# Patient Record
Sex: MALE | Race: WHITE | NOT HISPANIC OR LATINO | Employment: OTHER | URBAN - METROPOLITAN AREA
[De-identification: names, ages, dates, MRNs, and addresses within clinical notes are randomized per-mention and may not be internally consistent; named-entity substitution may affect disease eponyms.]

---

## 2018-05-02 ENCOUNTER — OFFICE VISIT (OUTPATIENT)
Dept: INTERNAL MEDICINE | Facility: CLINIC | Age: 73
End: 2018-05-02
Attending: FAMILY MEDICINE
Payer: MEDICARE

## 2018-05-02 VITALS
RESPIRATION RATE: 18 BRPM | WEIGHT: 216.2 LBS | OXYGEN SATURATION: 97 % | SYSTOLIC BLOOD PRESSURE: 138 MMHG | DIASTOLIC BLOOD PRESSURE: 70 MMHG | HEART RATE: 80 BPM | BODY MASS INDEX: 28.65 KG/M2 | TEMPERATURE: 98.4 F | HEIGHT: 73 IN

## 2018-05-02 DIAGNOSIS — L30.9 DERMATITIS: ICD-10-CM

## 2018-05-02 DIAGNOSIS — J45.20 MILD INTERMITTENT ASTHMA, UNSPECIFIED WHETHER COMPLICATED: Primary | ICD-10-CM

## 2018-05-02 DIAGNOSIS — Z13.220 LIPID SCREENING: ICD-10-CM

## 2018-05-02 PROCEDURE — 99214 OFFICE O/P EST MOD 30 MIN: CPT | Performed by: FAMILY MEDICINE

## 2018-05-02 RX ORDER — FLUTICASONE FUROATE AND VILANTEROL 100; 25 UG/1; UG/1
POWDER RESPIRATORY (INHALATION) DAILY
COMMUNITY
Start: 2018-01-09 | End: 2018-06-11 | Stop reason: ALTCHOICE

## 2018-05-02 RX ORDER — MOMETASONE FUROATE 1 MG/G
0.1 CREAM TOPICAL DAILY
COMMUNITY
Start: 2017-11-01 | End: 2019-04-19 | Stop reason: SDUPTHER

## 2018-05-02 RX ORDER — KETOCONAZOLE 20 MG/G
2 CREAM TOPICAL DAILY
COMMUNITY
Start: 2017-11-01 | End: 2022-09-13 | Stop reason: SDUPTHER

## 2018-05-02 RX ORDER — FLUTICASONE PROPIONATE 50 MCG
1 SPRAY, SUSPENSION (ML) NASAL DAILY
Qty: 1 BOTTLE | Refills: 5 | Status: SHIPPED | OUTPATIENT
Start: 2018-05-02 | End: 2019-05-20 | Stop reason: SDUPTHER

## 2018-05-02 RX ORDER — KETOCONAZOLE 20 MG/ML
2 SHAMPOO, SUSPENSION TOPICAL DAILY
COMMUNITY
Start: 2017-11-01 | End: 2019-05-20 | Stop reason: SDUPTHER

## 2018-05-02 RX ORDER — FLUOCINONIDE 0.5 MG/G
0.05 CREAM TOPICAL DAILY
COMMUNITY
End: 2019-05-20 | Stop reason: SDUPTHER

## 2018-05-02 RX ORDER — TACROLIMUS 1 MG/G
0.1 OINTMENT TOPICAL DAILY
COMMUNITY
Start: 2017-02-03 | End: 2023-12-04 | Stop reason: SDUPTHER

## 2018-05-02 RX ORDER — CLOBETASOL PROPIONATE 0.5 MG/G
0.05 CREAM TOPICAL DAILY
COMMUNITY
Start: 2016-05-02 | End: 2018-11-14 | Stop reason: SDUPTHER

## 2018-05-02 RX ORDER — ALBUTEROL SULFATE 90 UG/1
2 INHALANT RESPIRATORY (INHALATION) EVERY 6 HOURS PRN
Qty: 1 INHALER | Refills: 11 | Status: SHIPPED | OUTPATIENT
Start: 2018-05-02 | End: 2022-01-19 | Stop reason: SDUPTHER

## 2018-05-02 RX ORDER — AZELASTINE 1 MG/ML
SPRAY, METERED NASAL DAILY
COMMUNITY
Start: 2017-11-01 | End: 2019-01-16 | Stop reason: SDUPTHER

## 2018-05-02 ASSESSMENT — ENCOUNTER SYMPTOMS
FATIGUE: 0
WEAKNESS: 0
HEADACHES: 0
DIFFICULTY URINATING: 0
CHEST TIGHTNESS: 1
NERVOUS/ANXIOUS: 0
DYSPNEA ON EXERTION: 0
COUGH: 0
ABDOMINAL PAIN: 0
APPETITE CHANGE: 0
SLEEP DISTURBANCE: 0
BACK PAIN: 0
DYSURIA: 0
SHORTNESS OF BREATH: 0
TROUBLE SWALLOWING: 0
CONSTIPATION: 0

## 2018-05-02 NOTE — PATIENT INSTRUCTIONS
Problem List Items Addressed This Visit     Mild intermittent asthma - Primary     Stable, continue to use inhalers           Relevant Medications    fluticasone-vilanterol (BREO ELLIPTA) 100-25 mcg/dose powder for inhalation    albuterol HFA (VENTOLIN HFA) 90 mcg/actuation inhaler    Other Relevant Orders    CBC    Comprehensive metabolic panel    Dermatitis     Continue to use topical steroids as needed  Follow up with dermatology          Relevant Medications    clobetasol (TEMOVATE) 0.05 % cream    fluocinonide (LIDEX) 0.05 % cream    ketoconazole (NIZORAL) 2 % shampoo    ketoconazole (NIZORAL) 2 % cream    mometasone (ELOCON) 0.1 % cream    tacrolimus (PROTOPIC) 0.1 % ointment      Other Visit Diagnoses     Lipid screening        Relevant Orders    Lipid panel          No Follow-up on file.    Chuck Ahuja DO

## 2018-05-02 NOTE — PROGRESS NOTES
Chief Complaint   Patient presents with   • Follow-up     6 mo f/u        Subjective      Patient ID: Zaki Wilder is a 72 y.o. male.    Pt peels that his asthma is less well controlled since changing to Breo from Advair due to insurance formulary change.      Asthma   He complains of chest tightness. There is no cough or shortness of breath. This is a chronic problem. The current episode started more than 1 year ago. The problem occurs intermittently. The problem has been gradually worsening. Associated symptoms include postnasal drip. Pertinent negatives include no appetite change, chest pain, dyspnea on exertion, headaches or trouble swallowing. His symptoms are aggravated by nothing. His symptoms are alleviated by beta-agonist. His past medical history is significant for asthma.       The following have been reviewed and updated as appropriate in this visit:        Review of Systems   Constitutional: Negative for appetite change and fatigue.   HENT: Positive for postnasal drip. Negative for hearing loss and trouble swallowing.    Eyes: Negative for visual disturbance.   Respiratory: Negative for cough and shortness of breath.    Cardiovascular: Negative for chest pain, dyspnea on exertion and leg swelling.   Gastrointestinal: Negative for abdominal pain and constipation.   Genitourinary: Negative for difficulty urinating and dysuria.   Musculoskeletal: Negative for back pain and gait problem.   Skin: Negative for rash.   Neurological: Negative for weakness and headaches.   Psychiatric/Behavioral: Negative for sleep disturbance. The patient is not nervous/anxious.          Current Outpatient Prescriptions:   •  azelastine (ASTELIN) 137 mcg (0.1 %) nasal spray, Inhale daily., Disp: , Rfl:   •  clobetasol (TEMOVATE) 0.05 % cream, Apply 0.05 % topically daily., Disp: , Rfl:   •  fluocinonide (LIDEX) 0.05 % cream, Apply 0.05 % topically daily., Disp: , Rfl:   •  fluticasone-vilanterol (BREO ELLIPTA) 100-25 mcg/dose  "powder for inhalation, Inhale daily., Disp: , Rfl:   •  ketoconazole (NIZORAL) 2 % cream, Apply 2 % topically daily., Disp: , Rfl:   •  ketoconazole (NIZORAL) 2 % shampoo, Apply 2 % topically daily., Disp: , Rfl:   •  mometasone (ELOCON) 0.1 % cream, Apply 0.1 % topically daily., Disp: , Rfl:   •  tacrolimus (PROTOPIC) 0.1 % ointment, Apply 0.1 % topically daily., Disp: , Rfl:   •  albuterol HFA (VENTOLIN HFA) 90 mcg/actuation inhaler, Inhale 2 puffs every 6 (six) hours as needed for wheezing., Disp: 1 Inhaler, Rfl: 11  •  fluticasone (FLONASE) 50 mcg/actuation nasal spray, Administer 1 spray into each nostril daily., Disp: 1 Bottle, Rfl: 5    Objective     Vitals:    05/02/18 1002   BP: 138/70   BP Location: Left upper arm   Patient Position: Sitting   Pulse: 80   Resp: 18   Temp: 36.9 °C (98.4 °F)   TempSrc: Oral   SpO2: 97%   Weight: 98.1 kg (216 lb 3.2 oz)   Height: 1.854 m (6' 1\")     Physical Exam   Constitutional: He appears well-developed and well-nourished.   HENT:   Head: Normocephalic.   Eyes: Pupils are equal, round, and reactive to light.   Neck: Normal range of motion.   Cardiovascular: Normal rate and regular rhythm.    Pulmonary/Chest: Effort normal and breath sounds normal.   Abdominal: Soft. Bowel sounds are normal.   Musculoskeletal: Normal range of motion.   Neurological: He is alert.   Skin: Skin is warm and dry.   Psychiatric: He has a normal mood and affect.         Assessment/Plan   Problem List Items Addressed This Visit     Mild intermittent asthma - Primary     Stable, continue to use inhalers           Relevant Medications    fluticasone-vilanterol (BREO ELLIPTA) 100-25 mcg/dose powder for inhalation    albuterol HFA (VENTOLIN HFA) 90 mcg/actuation inhaler    Other Relevant Orders    CBC    Comprehensive metabolic panel    Dermatitis     Continue to use topical steroids as needed  Follow up with dermatology          Relevant Medications    clobetasol (TEMOVATE) 0.05 % cream    " fluocinonide (LIDEX) 0.05 % cream    ketoconazole (NIZORAL) 2 % shampoo    ketoconazole (NIZORAL) 2 % cream    mometasone (ELOCON) 0.1 % cream    tacrolimus (PROTOPIC) 0.1 % ointment      Other Visit Diagnoses     Lipid screening        Relevant Orders    Lipid panel          No Follow-up on file.    Chuck Ahuja, DO

## 2018-05-07 ENCOUNTER — CLINICAL SUPPORT (OUTPATIENT)
Dept: INTERNAL MEDICINE | Facility: CLINIC | Age: 73
End: 2018-05-07
Payer: MEDICARE

## 2018-05-07 DIAGNOSIS — J45.20 MILD INTERMITTENT ASTHMA, UNSPECIFIED WHETHER COMPLICATED: ICD-10-CM

## 2018-05-07 DIAGNOSIS — Z13.220 SCREENING FOR LIPOID DISORDERS: ICD-10-CM

## 2018-05-07 DIAGNOSIS — Z13.220 LIPID SCREENING: ICD-10-CM

## 2018-05-07 LAB
ALBUMIN SERPL-MCNC: 4.5 G/DL (ref 3.4–5)
ALP SERPL-CCNC: 58 IU/L (ref 35–126)
ALT SERPL-CCNC: 25 IU/L (ref 16–63)
ANION GAP SERPL CALC-SCNC: 8 MEQ/L (ref 3–15)
AST SERPL-CCNC: 26 IU/L (ref 15–41)
BILIRUB SERPL-MCNC: 0.8 MG/DL (ref 0.3–1.2)
BUN SERPL-MCNC: 9 MG/DL (ref 8–20)
CALCIUM SERPL-MCNC: 9.6 MG/DL (ref 8.9–10.3)
CHLORIDE SERPL-SCNC: 106 MMOL/L (ref 98–109)
CHOLEST SERPL-MCNC: 207 MG/DL
CO2 SERPL-SCNC: 26 MMOL/L (ref 22–32)
CREAT SERPL-MCNC: 1 MG/DL (ref 0.8–1.3)
ERYTHROCYTE [DISTWIDTH] IN BLOOD BY AUTOMATED COUNT: 13.4 % (ref 11.6–14.4)
GFR SERPL CREATININE-BSD FRML MDRD: >60 ML/MIN/1.73M*2
GLUCOSE SERPL-MCNC: 88 MG/DL (ref 70–99)
HCT VFR BLDCO AUTO: 49.3 % (ref 40–51)
HDLC SERPL-MCNC: 55 MG/DL
HDLC SERPL: 3.8 {RATIO}
HGB BLD-MCNC: 16.4 G/DL (ref 13.7–17.5)
LDLC SERPL CALC-MCNC: 134 MG/DL
MCH RBC QN AUTO: 30.5 PG (ref 28–33.2)
MCHC RBC AUTO-ENTMCNC: 33.3 G/DL (ref 32.2–36.5)
MCV RBC AUTO: 91.6 FL (ref 83–98)
NONHDLC SERPL-MCNC: 152 MG/DL
PDW BLD AUTO: 10.1 FL (ref 9.4–12.4)
PLATELET # BLD AUTO: 212 K/UL (ref 150–350)
POTASSIUM SERPL-SCNC: 4.4 MMOL/L (ref 3.6–5.1)
PROT SERPL-MCNC: 6.9 G/DL (ref 6–8.2)
RBC # BLD AUTO: 5.38 M/UL (ref 4.5–5.8)
SODIUM SERPL-SCNC: 140 MMOL/L (ref 136–144)
TRIGL SERPL-MCNC: 90 MG/DL (ref 30–149)
WBC # BLD AUTO: 8.8 K/UL (ref 3.8–10.5)

## 2018-05-07 PROCEDURE — 36415 COLL VENOUS BLD VENIPUNCTURE: CPT

## 2018-05-07 PROCEDURE — 80053 COMPREHEN METABOLIC PANEL: CPT | Performed by: FAMILY MEDICINE

## 2018-05-07 PROCEDURE — 80061 LIPID PANEL: CPT | Performed by: FAMILY MEDICINE

## 2018-05-07 PROCEDURE — 36415 COLL VENOUS BLD VENIPUNCTURE: CPT | Performed by: INTERNAL MEDICINE

## 2018-05-07 PROCEDURE — 85027 COMPLETE CBC AUTOMATED: CPT | Performed by: FAMILY MEDICINE

## 2018-06-11 ENCOUNTER — OFFICE VISIT (OUTPATIENT)
Dept: INTERNAL MEDICINE | Facility: CLINIC | Age: 73
End: 2018-06-11
Payer: MEDICARE

## 2018-06-11 VITALS
WEIGHT: 218.8 LBS | HEART RATE: 73 BPM | TEMPERATURE: 97.9 F | RESPIRATION RATE: 16 BRPM | BODY MASS INDEX: 29 KG/M2 | DIASTOLIC BLOOD PRESSURE: 92 MMHG | HEIGHT: 73 IN | OXYGEN SATURATION: 95 % | SYSTOLIC BLOOD PRESSURE: 149 MMHG

## 2018-06-11 DIAGNOSIS — N48.9 PENILE LESION: Primary | ICD-10-CM

## 2018-06-11 DIAGNOSIS — L30.9 DERMATITIS: ICD-10-CM

## 2018-06-11 PROCEDURE — 99214 OFFICE O/P EST MOD 30 MIN: CPT | Performed by: FAMILY MEDICINE

## 2018-06-11 RX ORDER — FLUTICASONE PROPIONATE AND SALMETEROL 100; 50 UG/1; UG/1
1 POWDER RESPIRATORY (INHALATION)
Qty: 60 EACH | Refills: 5 | Status: SHIPPED | OUTPATIENT
Start: 2018-06-11 | End: 2018-11-14 | Stop reason: SDUPTHER

## 2018-06-11 NOTE — PATIENT INSTRUCTIONS
Problem List Items Addressed This Visit     Dermatitis     If no improvement I will ask you to see dermatology         Penile lesion - Primary     Suspect candidal infection  Stop using mometasone on lesion  Stop using mupirocin  Start using ketoconazole cream twice daily  Keep area clean               No Follow-up on file.    Chuck Ahuja, DO

## 2018-06-11 NOTE — ASSESSMENT & PLAN NOTE
Suspect candidal infection  Stop using mometasone on lesion  Stop using mupirocin  Start using ketoconazole cream twice daily  Keep area clean

## 2018-06-11 NOTE — PROGRESS NOTES
Chief Complaint   Patient presents with   • Other     wound  on penis , x 1 month .red , burning . pt used ointment on the area   • Other     lab results        Subjective      Patient ID: Zaki Wilder is a 72 y.o. male.    Rash   This is a new problem. The current episode started 1 to 4 weeks ago. The problem has been gradually worsening since onset. Location: Penis. The rash is characterized by redness (ulcerated). He was exposed to nothing. Pertinent negatives include no cough, fatigue or shortness of breath. Past treatments include topical steroids and antibiotic cream. The treatment provided no relief. His past medical history is significant for asthma and eczema.       The following have been reviewed and updated as appropriate in this visit:        Review of Systems   Constitutional: Negative for appetite change and fatigue.   HENT: Negative for hearing loss and trouble swallowing.    Eyes: Negative for visual disturbance.   Respiratory: Negative for cough and shortness of breath.    Cardiovascular: Negative for chest pain and leg swelling.   Gastrointestinal: Negative for abdominal pain and constipation.   Genitourinary: Positive for genital sores. Negative for decreased urine volume, difficulty urinating, discharge, dysuria, hematuria, penile pain, penile swelling, testicular pain and urgency.   Musculoskeletal: Negative for back pain and gait problem.   Skin: Positive for rash.   Neurological: Negative for weakness and headaches.   Psychiatric/Behavioral: Negative for sleep disturbance. The patient is not nervous/anxious.          Current Outpatient Prescriptions:   •  azelastine (ASTELIN) 137 mcg (0.1 %) nasal spray, Inhale daily., Disp: , Rfl:   •  clobetasol (TEMOVATE) 0.05 % cream, Apply 0.05 % topically daily., Disp: , Rfl:   •  fluocinonide (LIDEX) 0.05 % cream, Apply 0.05 % topically daily., Disp: , Rfl:   •  ketoconazole (NIZORAL) 2 % cream, Apply 2 % topically daily., Disp: , Rfl:   •   "ketoconazole (NIZORAL) 2 % shampoo, Apply 2 % topically daily., Disp: , Rfl:   •  mometasone (ELOCON) 0.1 % cream, Apply 0.1 % topically daily., Disp: , Rfl:   •  tacrolimus (PROTOPIC) 0.1 % ointment, Apply 0.1 % topically daily., Disp: , Rfl:   •  fluticasone-salmeterol (ADVAIR DISKUS) 100-50 mcg/dose diskus inhaler, Inhale 1 puff 2 (two) times a day., Disp: 60 each, Rfl: 5    Objective     Vitals:    06/11/18 1433   BP: (!) 149/92   BP Location: Left upper arm   Patient Position: Sitting   Pulse: 73   Resp: 16   Temp: 36.6 °C (97.9 °F)   TempSrc: Oral   SpO2: 95%   Weight: 99.2 kg (218 lb 12.8 oz)   Height: 1.848 m (6' 0.75\")     Physical Exam   Constitutional: He is oriented to person, place, and time. He appears well-developed and well-nourished.   Eyes: EOM are normal. Pupils are equal, round, and reactive to light.   Neck: Normal range of motion. Neck supple.   Cardiovascular: Normal rate and regular rhythm.    Pulmonary/Chest: Effort normal and breath sounds normal.   Abdominal: Soft. Bowel sounds are normal. There is no tenderness.   Genitourinary:         Musculoskeletal: Normal range of motion.   Lymphadenopathy:     He has no cervical adenopathy.   Neurological: He is alert and oriented to person, place, and time.   Skin: Skin is warm and dry.         Assessment/Plan   Problem List Items Addressed This Visit     Dermatitis     If no improvement I will ask you to see dermatology         Penile lesion - Primary     Suspect candidal infection  Stop using mometasone on lesion  Stop using mupirocin  Start using ketoconazole cream twice daily  Keep area clean               No Follow-up on file.    Chuck Ahuja, DO  "

## 2018-06-13 ASSESSMENT — ENCOUNTER SYMPTOMS
SHORTNESS OF BREATH: 0
ABDOMINAL PAIN: 0
NERVOUS/ANXIOUS: 0
CONSTIPATION: 0
HEMATURIA: 0
APPETITE CHANGE: 0
SLEEP DISTURBANCE: 0
FATIGUE: 0
BACK PAIN: 0
TROUBLE SWALLOWING: 0
DYSURIA: 0
COUGH: 0
HEADACHES: 0
WEAKNESS: 0
DIFFICULTY URINATING: 0

## 2018-11-14 ENCOUNTER — OFFICE VISIT (OUTPATIENT)
Dept: INTERNAL MEDICINE | Facility: CLINIC | Age: 73
End: 2018-11-14
Payer: MEDICARE

## 2018-11-14 VITALS
WEIGHT: 215 LBS | OXYGEN SATURATION: 95 % | TEMPERATURE: 98.4 F | RESPIRATION RATE: 16 BRPM | HEIGHT: 73 IN | DIASTOLIC BLOOD PRESSURE: 80 MMHG | BODY MASS INDEX: 28.49 KG/M2 | HEART RATE: 84 BPM | SYSTOLIC BLOOD PRESSURE: 140 MMHG

## 2018-11-14 DIAGNOSIS — J45.20 MILD INTERMITTENT ASTHMA, UNSPECIFIED WHETHER COMPLICATED: ICD-10-CM

## 2018-11-14 DIAGNOSIS — M47.816 LUMBAR SPONDYLOSIS: Primary | ICD-10-CM

## 2018-11-14 DIAGNOSIS — M54.41 CHRONIC RIGHT-SIDED LOW BACK PAIN WITH RIGHT-SIDED SCIATICA: ICD-10-CM

## 2018-11-14 DIAGNOSIS — G89.29 CHRONIC RIGHT-SIDED LOW BACK PAIN WITH RIGHT-SIDED SCIATICA: ICD-10-CM

## 2018-11-14 PROCEDURE — 99214 OFFICE O/P EST MOD 30 MIN: CPT | Mod: 25 | Performed by: FAMILY MEDICINE

## 2018-11-14 PROCEDURE — G0008 ADMIN INFLUENZA VIRUS VAC: HCPCS | Performed by: FAMILY MEDICINE

## 2018-11-14 PROCEDURE — 90653 IIV ADJUVANT VACCINE IM: CPT | Performed by: FAMILY MEDICINE

## 2018-11-14 RX ORDER — FLUTICASONE PROPIONATE AND SALMETEROL 100; 50 UG/1; UG/1
1 POWDER RESPIRATORY (INHALATION)
Qty: 180 EACH | Refills: 3 | Status: SHIPPED | OUTPATIENT
Start: 2018-11-14 | End: 2019-06-14 | Stop reason: SDUPTHER

## 2018-11-14 RX ORDER — CLOBETASOL PROPIONATE 0.5 MG/G
CREAM TOPICAL
Qty: 45 G | Refills: 1 | OUTPATIENT
Start: 2018-11-14 | End: 2023-01-07

## 2018-11-14 ASSESSMENT — ENCOUNTER SYMPTOMS
FATIGUE: 0
SLEEP DISTURBANCE: 0
COUGH: 0
TROUBLE SWALLOWING: 0
MYALGIAS: 1
ARTHRALGIAS: 1
DIFFICULTY URINATING: 0
WEAKNESS: 0
BACK PAIN: 0
NERVOUS/ANXIOUS: 0
HEADACHES: 0
ABDOMINAL PAIN: 0
SHORTNESS OF BREATH: 0
DYSURIA: 0
CONSTIPATION: 0
APPETITE CHANGE: 0

## 2018-11-14 NOTE — PATIENT INSTRUCTIONS
Problem List Items Addressed This Visit     Mild intermittent asthma     Stable, I will change you from Breo back to Advair in January as your insurance allows         Relevant Medications    fluticasone-salmeterol (ADVAIR DISKUS) 100-50 mcg/dose diskus inhaler    Lumbar spondylosis - Primary     I will order X-rays of your lower back  Refer to physical therapy  Please consider cutting back on calories a little, modest weight loss can take strain off of your lower back.         Relevant Orders    X-RAY LUMBAR SPINE 2 OR 3 VIEWS    Ambulatory referral to Physical Therapy      Other Visit Diagnoses     Chronic right-sided low back pain with right-sided sciatica        Relevant Orders    Ambulatory referral to Physical Therapy          No Follow-up on file.    Chuck Ahuja, DO

## 2018-11-14 NOTE — PROGRESS NOTES
Chief Complaint   Patient presents with   • Follow-up     PT script for Right leg pain hip to foot x months       Subjective      Patient ID: Zaki Wilder is a 73 y.o. male.    Pt seen and examined for routine follow up of chonic conditions.  C/o intermittent right leg pain.        The following have been reviewed and updated as appropriate in this visit:        Review of Systems   Constitutional: Negative for appetite change and fatigue.   HENT: Negative for hearing loss and trouble swallowing.    Eyes: Negative for visual disturbance.   Respiratory: Negative for cough and shortness of breath.    Cardiovascular: Negative for chest pain and leg swelling.   Gastrointestinal: Negative for abdominal pain and constipation.   Genitourinary: Negative for difficulty urinating and dysuria.   Musculoskeletal: Positive for arthralgias and myalgias. Negative for back pain and gait problem.   Skin: Negative for rash.   Neurological: Negative for weakness and headaches.   Psychiatric/Behavioral: Negative for sleep disturbance. The patient is not nervous/anxious.          Current Outpatient Prescriptions:   •  azelastine (ASTELIN) 137 mcg (0.1 %) nasal spray, Inhale daily., Disp: , Rfl:   •  clobetasol (TEMOVATE) 0.05 % cream, Apply to affected area as daily as needed, Disp: 45 g, Rfl: 1  •  fluocinonide (LIDEX) 0.05 % cream, Apply 0.05 % topically daily., Disp: , Rfl:   •  ketoconazole (NIZORAL) 2 % cream, Apply 2 % topically daily., Disp: , Rfl:   •  ketoconazole (NIZORAL) 2 % shampoo, Apply 2 % topically daily., Disp: , Rfl:   •  mometasone (ELOCON) 0.1 % cream, Apply 0.1 % topically daily., Disp: , Rfl:   •  tacrolimus (PROTOPIC) 0.1 % ointment, Apply 0.1 % topically daily., Disp: , Rfl:   •  fluticasone-salmeterol (ADVAIR DISKUS) 100-50 mcg/dose diskus inhaler, Inhale 1 puff 2 (two) times a day., Disp: 180 each, Rfl: 3    Objective     Vitals:    11/14/18 0922   BP: 140/80   Pulse: 84   Resp: 16   Temp: 36.9 °C (98.4 °F)  "  TempSrc: Oral   SpO2: 95%   Weight: 97.5 kg (215 lb)   Height: 1.848 m (6' 0.75\")     Physical Exam   Constitutional: He is oriented to person, place, and time. He appears well-developed and well-nourished.   HENT:   Head: Normocephalic and atraumatic.   Eyes: Pupils are equal, round, and reactive to light.   Neck: Normal range of motion. Neck supple.   Cardiovascular: Normal rate and regular rhythm.    Pulmonary/Chest: Effort normal and breath sounds normal.   Abdominal: Soft.   Musculoskeletal: Normal range of motion.   Neurological: He is alert and oriented to person, place, and time.   Skin: Skin is warm and dry.         Assessment/Plan   Problem List Items Addressed This Visit     Mild intermittent asthma     Stable, I will change you from Breo back to Advair in January as your insurance allows         Relevant Medications    fluticasone-salmeterol (ADVAIR DISKUS) 100-50 mcg/dose diskus inhaler    Lumbar spondylosis - Primary     I will order X-rays of your lower back  Refer to physical therapy  Please consider cutting back on calories a little, modest weight loss can take strain off of your lower back.         Relevant Orders    X-RAY LUMBAR SPINE 2 OR 3 VIEWS    Ambulatory referral to Physical Therapy      Other Visit Diagnoses     Chronic right-sided low back pain with right-sided sciatica        Relevant Orders    Ambulatory referral to Physical Therapy          No Follow-up on file.    Chuck Ahuja, DO  "

## 2018-11-14 NOTE — ASSESSMENT & PLAN NOTE
I will order X-rays of your lower back  Refer to physical therapy  Please consider cutting back on calories a little, modest weight loss can take strain off of your lower back.

## 2018-12-26 ENCOUNTER — HOSPITAL ENCOUNTER (OUTPATIENT)
Dept: RADIOLOGY | Age: 73
Discharge: HOME | End: 2018-12-26
Attending: FAMILY MEDICINE
Payer: MEDICARE

## 2018-12-26 DIAGNOSIS — M47.816 LUMBAR SPONDYLOSIS: ICD-10-CM

## 2018-12-26 PROCEDURE — 72100 X-RAY EXAM L-S SPINE 2/3 VWS: CPT

## 2019-01-03 ENCOUNTER — TELEPHONE (OUTPATIENT)
Dept: INTERNAL MEDICINE | Facility: CLINIC | Age: 74
End: 2019-01-03

## 2019-01-03 DIAGNOSIS — M47.816 LUMBAR SPONDYLOSIS: Primary | ICD-10-CM

## 2019-01-03 DIAGNOSIS — G89.29 CHRONIC RIGHT-SIDED LOW BACK PAIN WITH RIGHT-SIDED SCIATICA: ICD-10-CM

## 2019-01-03 DIAGNOSIS — M54.41 CHRONIC RIGHT-SIDED LOW BACK PAIN WITH RIGHT-SIDED SCIATICA: ICD-10-CM

## 2019-01-16 RX ORDER — AZELASTINE 1 MG/ML
1 SPRAY, METERED NASAL DAILY
Qty: 30 ML | Refills: 1 | Status: SHIPPED | OUTPATIENT
Start: 2019-01-16 | End: 2019-01-22 | Stop reason: SDUPTHER

## 2019-01-22 RX ORDER — AZELASTINE 1 MG/ML
1 SPRAY, METERED NASAL DAILY
Qty: 30 ML | Refills: 1 | Status: SHIPPED | OUTPATIENT
Start: 2019-01-22 | End: 2019-06-14 | Stop reason: SDUPTHER

## 2019-04-19 RX ORDER — MOMETASONE FUROATE 1 MG/G
1 CREAM TOPICAL DAILY
Qty: 45 G | Refills: 3 | Status: SHIPPED | OUTPATIENT
Start: 2019-04-19 | End: 2020-03-31 | Stop reason: SDUPTHER

## 2019-05-20 RX ORDER — FLUTICASONE PROPIONATE 50 MCG
1 SPRAY, SUSPENSION (ML) NASAL DAILY
Qty: 1 BOTTLE | Refills: 5 | Status: SHIPPED | OUTPATIENT
Start: 2019-05-20 | End: 2021-03-15

## 2019-05-20 RX ORDER — FLUOCINONIDE 0.5 MG/G
1 CREAM TOPICAL DAILY
Qty: 30 G | Refills: 1 | Status: SHIPPED | OUTPATIENT
Start: 2019-05-20 | End: 2020-03-31 | Stop reason: SDUPTHER

## 2019-05-20 RX ORDER — KETOCONAZOLE 20 MG/ML
1 SHAMPOO, SUSPENSION TOPICAL 2 TIMES WEEKLY
Qty: 120 ML | Refills: 0 | Status: SHIPPED | OUTPATIENT
Start: 2019-05-20 | End: 2019-12-16 | Stop reason: SDUPTHER

## 2019-05-20 NOTE — TELEPHONE ENCOUNTER
Medicine Refill Request    Last Office Visit: 11/14/2018  Next Office Visit: 5/20/2019        Current Outpatient Prescriptions:   •  azelastine (ASTELIN) 137 mcg (0.1 %) nasal spray, Administer 1 spray into each nostril daily., Disp: 30 mL, Rfl: 1  •  clobetasol (TEMOVATE) 0.05 % cream, Apply to affected area as daily as needed, Disp: 45 g, Rfl: 1  •  fluocinonide (LIDEX) 0.05 % cream, Apply 0.05 % topically daily., Disp: , Rfl:   •  fluticasone-salmeterol (ADVAIR DISKUS) 100-50 mcg/dose diskus inhaler, Inhale 1 puff 2 (two) times a day., Disp: 180 each, Rfl: 3  •  ketoconazole (NIZORAL) 2 % cream, Apply 2 % topically daily., Disp: , Rfl:   •  ketoconazole (NIZORAL) 2 % shampoo, Apply 2 % topically daily., Disp: , Rfl:   •  mometasone (ELOCON) 0.1 % cream, Apply 1 application topically daily., Disp: 45 g, Rfl: 3  •  tacrolimus (PROTOPIC) 0.1 % ointment, Apply 0.1 % topically daily., Disp: , Rfl:       BP Readings from Last 3 Encounters:   11/14/18 140/80   06/11/18 (!) 149/92   05/02/18 138/70       Recent Lab results:  Lab Results   Component Value Date    CHOL 207 (H) 05/07/2018   ,   Lab Results   Component Value Date    HDL 55 05/07/2018   ,   Lab Results   Component Value Date    LDLCALC 134 (H) 05/07/2018   ,   Lab Results   Component Value Date    TRIG 90 05/07/2018        Lab Results   Component Value Date    GLUCOSE 88 05/07/2018   , No results found for: HGBA1C      Lab Results   Component Value Date    CREATININE 1.0 05/07/2018

## 2019-06-14 ENCOUNTER — OFFICE VISIT (OUTPATIENT)
Dept: INTERNAL MEDICINE | Facility: CLINIC | Age: 74
End: 2019-06-14
Payer: MEDICARE

## 2019-06-14 VITALS
DIASTOLIC BLOOD PRESSURE: 80 MMHG | BODY MASS INDEX: 27.79 KG/M2 | SYSTOLIC BLOOD PRESSURE: 130 MMHG | TEMPERATURE: 97.5 F | OXYGEN SATURATION: 98 % | RESPIRATION RATE: 16 BRPM | WEIGHT: 209.2 LBS | HEART RATE: 66 BPM

## 2019-06-14 DIAGNOSIS — M47.816 LUMBAR SPONDYLOSIS: ICD-10-CM

## 2019-06-14 DIAGNOSIS — Z13.220 SCREENING FOR CHOLESTEROL LEVEL: ICD-10-CM

## 2019-06-14 DIAGNOSIS — L30.9 DERMATITIS: ICD-10-CM

## 2019-06-14 DIAGNOSIS — Z00.00 ANNUAL PHYSICAL EXAM: ICD-10-CM

## 2019-06-14 DIAGNOSIS — J45.20 MILD INTERMITTENT ASTHMA, UNSPECIFIED WHETHER COMPLICATED: Primary | ICD-10-CM

## 2019-06-14 PROCEDURE — 99214 OFFICE O/P EST MOD 30 MIN: CPT | Performed by: FAMILY MEDICINE

## 2019-06-14 RX ORDER — FLUTICASONE PROPIONATE AND SALMETEROL 100; 50 UG/1; UG/1
1 POWDER RESPIRATORY (INHALATION)
Qty: 3 EACH | Refills: 3 | Status: SHIPPED | OUTPATIENT
Start: 2019-06-14 | End: 2020-06-24 | Stop reason: SDUPTHER

## 2019-06-14 RX ORDER — AZELASTINE 1 MG/ML
1 SPRAY, METERED NASAL DAILY
Qty: 30 ML | Refills: 3 | Status: SHIPPED | OUTPATIENT
Start: 2019-06-14 | End: 2020-01-28 | Stop reason: SDUPTHER

## 2019-06-14 NOTE — PATIENT INSTRUCTIONS
Mild intermittent asthma  Well controlled with Advair    Lumbar spondylosis  Stable  Improved after physical therapy    Dermatitis  Stable, follow up with dermatology    Screening for cholesterol level  Check fasting lipids      Orders Placed This Encounter   Procedures   • CBC and Differential   • Comprehensive metabolic panel   • Lipid panel     New Medications Ordered This Visit   Medications   • fluticasone propion-salmeterol (ADVAIR DISKUS) 100-50 mcg/dose diskus inhaler     Sig: Inhale 1 puff 2 (two) times a day.     Dispense:  3 each     Refill:  3   • azelastine (ASTELIN) 137 mcg (0.1 %) nasal spray     Sig: Administer 1 spray into each nostril daily.     Dispense:  30 mL     Refill:  3       No Follow-up on file.     Chuck Ahuja, DO

## 2019-06-23 ASSESSMENT — ENCOUNTER SYMPTOMS
WEAKNESS: 0
ABDOMINAL PAIN: 0
DIFFICULTY URINATING: 0
DYSURIA: 0
COUGH: 0
FATIGUE: 0
NERVOUS/ANXIOUS: 0
HEADACHES: 0
BACK PAIN: 0
APPETITE CHANGE: 0
CONSTIPATION: 0
SHORTNESS OF BREATH: 0
SLEEP DISTURBANCE: 0
TROUBLE SWALLOWING: 0

## 2019-07-11 LAB
ALBUMIN SERPL-MCNC: 4.4 G/DL (ref 3.5–4.8)
ALBUMIN/GLOB SERPL: 1.8 {RATIO} (ref 1.2–2.2)
ALP SERPL-CCNC: 65 IU/L (ref 39–117)
ALT SERPL-CCNC: 19 IU/L (ref 0–44)
AST SERPL-CCNC: 22 IU/L (ref 0–40)
BASOPHILS # BLD AUTO: 0.1 X10E3/UL (ref 0–0.2)
BASOPHILS NFR BLD AUTO: 1 %
BILIRUB SERPL-MCNC: 0.8 MG/DL (ref 0–1.2)
BUN SERPL-MCNC: 9 MG/DL (ref 8–27)
BUN/CREAT SERPL: 8 (ref 10–24)
CALCIUM SERPL-MCNC: 9.9 MG/DL (ref 8.6–10.2)
CHLORIDE SERPL-SCNC: 107 MMOL/L (ref 96–106)
CHOLEST SERPL-MCNC: 213 MG/DL (ref 100–199)
CO2 SERPL-SCNC: 24 MMOL/L (ref 20–29)
CREAT SERPL-MCNC: 1.09 MG/DL (ref 0.76–1.27)
EOSINOPHIL # BLD AUTO: 0.8 X10E3/UL (ref 0–0.4)
EOSINOPHIL NFR BLD AUTO: 10 %
ERYTHROCYTE [DISTWIDTH] IN BLOOD BY AUTOMATED COUNT: 13.6 % (ref 12.3–15.4)
GLOBULIN SER CALC-MCNC: 2.5 G/DL (ref 1.5–4.5)
GLUCOSE SERPL-MCNC: 89 MG/DL (ref 65–99)
HCT VFR BLD AUTO: 48.6 % (ref 37.5–51)
HDLC SERPL-MCNC: 51 MG/DL
HGB BLD-MCNC: 16.6 G/DL (ref 13–17.7)
IMM GRANULOCYTES # BLD AUTO: 0 X10E3/UL (ref 0–0.1)
IMM GRANULOCYTES NFR BLD AUTO: 0 %
LAB CORP EGFR IF AFRICN AM: 79 ML/MIN/1.73
LAB CORP EGFR IF NONAFRICN AM: 68 ML/MIN/1.73
LDLC SERPL CALC-MCNC: 145 MG/DL (ref 0–99)
LYMPHOCYTES # BLD AUTO: 2 X10E3/UL (ref 0.7–3.1)
LYMPHOCYTES NFR BLD AUTO: 25 %
MCH RBC QN AUTO: 30.4 PG (ref 26.6–33)
MCHC RBC AUTO-ENTMCNC: 34.2 G/DL (ref 31.5–35.7)
MCV RBC AUTO: 89 FL (ref 79–97)
MONOCYTES # BLD AUTO: 0.6 X10E3/UL (ref 0.1–0.9)
MONOCYTES NFR BLD AUTO: 7 %
NEUTROPHILS # BLD AUTO: 4.6 X10E3/UL (ref 1.4–7)
NEUTROPHILS NFR BLD AUTO: 57 %
PLATELET # BLD AUTO: 212 X10E3/UL (ref 150–450)
POTASSIUM SERPL-SCNC: 5.3 MMOL/L (ref 3.5–5.2)
PROT SERPL-MCNC: 6.9 G/DL (ref 6–8.5)
RBC # BLD AUTO: 5.46 X10E6/UL (ref 4.14–5.8)
SODIUM SERPL-SCNC: 144 MMOL/L (ref 134–144)
SPECIMEN STATUS: NORMAL
TRIGL SERPL-MCNC: 83 MG/DL (ref 0–149)
VLDLC SERPL CALC-MCNC: 17 MG/DL (ref 5–40)
WBC # BLD AUTO: 8.1 X10E3/UL (ref 3.4–10.8)

## 2019-12-16 ENCOUNTER — OFFICE VISIT (OUTPATIENT)
Dept: INTERNAL MEDICINE | Facility: CLINIC | Age: 74
End: 2019-12-16
Payer: MEDICARE

## 2019-12-16 VITALS
BODY MASS INDEX: 27.74 KG/M2 | TEMPERATURE: 97.9 F | SYSTOLIC BLOOD PRESSURE: 130 MMHG | OXYGEN SATURATION: 98 % | DIASTOLIC BLOOD PRESSURE: 72 MMHG | HEART RATE: 66 BPM | WEIGHT: 208.8 LBS | RESPIRATION RATE: 16 BRPM

## 2019-12-16 DIAGNOSIS — Z00.00 GENERAL MEDICAL EXAM: Primary | ICD-10-CM

## 2019-12-16 DIAGNOSIS — M47.816 LUMBAR SPONDYLOSIS: ICD-10-CM

## 2019-12-16 DIAGNOSIS — J45.20 MILD INTERMITTENT ASTHMA, UNSPECIFIED WHETHER COMPLICATED: ICD-10-CM

## 2019-12-16 DIAGNOSIS — L30.9 DERMATITIS: ICD-10-CM

## 2019-12-16 PROBLEM — N48.9 PENILE LESION: Status: RESOLVED | Noted: 2018-06-11 | Resolved: 2019-12-16

## 2019-12-16 PROBLEM — Z13.220 SCREENING FOR CHOLESTEROL LEVEL: Status: RESOLVED | Noted: 2019-06-14 | Resolved: 2019-12-16

## 2019-12-16 PROCEDURE — 99214 OFFICE O/P EST MOD 30 MIN: CPT | Performed by: FAMILY MEDICINE

## 2019-12-16 RX ORDER — KETOCONAZOLE 20 MG/ML
1 SHAMPOO, SUSPENSION TOPICAL 2 TIMES WEEKLY
Qty: 120 ML | Refills: 0 | Status: SHIPPED | OUTPATIENT
Start: 2019-12-16 | End: 2020-06-24 | Stop reason: SDUPTHER

## 2019-12-16 NOTE — PROGRESS NOTES
Chief Complaint   Patient presents with   • Other     6 month follow up,medication refill       Subjective      Patient ID: Zaki Wilder is a 74 y.o. male.    Pt seen and examined for routine follow up of chronic conditions.  States he feels well and is in his usual state of health.  Patient Active Problem List:     Mild intermittent asthma     Dermatitis     Lumbar spondylosis        The following have been reviewed and updated as appropriate in this visit:        Review of Systems   Constitutional: Negative for appetite change and fatigue.   HENT: Negative for hearing loss and trouble swallowing.    Eyes: Negative for visual disturbance.   Respiratory: Negative for cough and shortness of breath.    Cardiovascular: Negative for chest pain and leg swelling.   Gastrointestinal: Negative for abdominal pain and constipation.   Genitourinary: Negative for difficulty urinating and dysuria.   Musculoskeletal: Negative for back pain and gait problem.   Skin: Negative for rash.   Neurological: Negative for weakness and headaches.   Psychiatric/Behavioral: Negative for sleep disturbance. The patient is not nervous/anxious.          Current Outpatient Medications:   •  azelastine (ASTELIN) 137 mcg (0.1 %) nasal spray, Administer 1 spray into each nostril daily., Disp: 30 mL, Rfl: 3  •  clobetasol (TEMOVATE) 0.05 % cream, Apply to affected area as daily as needed, Disp: 45 g, Rfl: 1  •  fluocinonide (LIDEX) 0.05 % cream, Apply 1 application topically daily., Disp: 30 g, Rfl: 1  •  fluticasone propion-salmeterol (ADVAIR DISKUS) 100-50 mcg/dose diskus inhaler, Inhale 1 puff 2 (two) times a day., Disp: 3 each, Rfl: 3  •  ketoconazole (NIZORAL) 2 % cream, Apply 2 % topically daily., Disp: , Rfl:   •  ketoconazole (NIZORAL) 2 % shampoo, Apply 1 application topically 2 (two) times a week (Mon, Thu)., Disp: 120 mL, Rfl: 0  •  mometasone (ELOCON) 0.1 % cream, Apply 1 application topically daily., Disp: 45 g, Rfl: 3  •  tacrolimus  (PROTOPIC) 0.1 % ointment, Apply 0.1 % topically daily., Disp: , Rfl:   •  fluticasone propionate (FLONASE) 50 mcg/actuation nasal spray, Administer 1 spray into each nostril daily., Disp: 1 Bottle, Rfl: 5    Objective     Vitals:    12/16/19 1510   BP: 130/72   BP Location: Left upper arm   Patient Position: Sitting   Pulse: 66   Resp: 16   Temp: 36.6 °C (97.9 °F)   TempSrc: Oral   SpO2: 98%   Weight: 94.7 kg (208 lb 12.8 oz)     Physical Exam   Constitutional: He is oriented to person, place, and time. He appears well-developed and well-nourished.   HENT:   Head: Normocephalic and atraumatic.   Eyes: Pupils are equal, round, and reactive to light. EOM are normal.   Neck: Normal range of motion. Neck supple.   Cardiovascular: Normal rate and regular rhythm.   Pulmonary/Chest: Breath sounds normal.   Abdominal: Soft. Bowel sounds are normal.   Musculoskeletal: Normal range of motion.   Neurological: He is alert and oriented to person, place, and time.   Skin: Skin is warm and dry.   Psychiatric: He has a normal mood and affect.         Assessment/Plan   Mild intermittent asthma  Stable on Advair    Lumbar spondylosis  No complaints  Denies current pain    Dermatitis  Stable        Orders Placed This Encounter   Procedures   • CBC and differential   • Comprehensive metabolic panel   • TSH   • Lipid panel   • Hemoglobin A1c     New Medications Ordered This Visit   Medications   • ketoconazole (NIZORAL) 2 % shampoo     Sig: Apply 1 application topically 2 (two) times a week (Mon, Thu).     Dispense:  120 mL     Refill:  0       No follow-ups on file.     Chuck Ahuja,

## 2019-12-16 NOTE — PATIENT INSTRUCTIONS
Mild intermittent asthma  Stable on Advair    Lumbar spondylosis  No complaints  Denies current pain    Dermatitis  Stable        Orders Placed This Encounter   Procedures   • CBC and differential   • Comprehensive metabolic panel   • TSH   • Lipid panel   • Hemoglobin A1c     New Medications Ordered This Visit   Medications   • ketoconazole (NIZORAL) 2 % shampoo     Sig: Apply 1 application topically 2 (two) times a week (Mon, Thu).     Dispense:  120 mL     Refill:  0       No follow-ups on file.     Chuck Ahuja, DO

## 2019-12-22 ASSESSMENT — ENCOUNTER SYMPTOMS
SLEEP DISTURBANCE: 0
ABDOMINAL PAIN: 0
BACK PAIN: 0
TROUBLE SWALLOWING: 0
HEADACHES: 0
CONSTIPATION: 0
WEAKNESS: 0
NERVOUS/ANXIOUS: 0
COUGH: 0
SHORTNESS OF BREATH: 0
APPETITE CHANGE: 0
DYSURIA: 0
DIFFICULTY URINATING: 0
FATIGUE: 0

## 2020-01-29 RX ORDER — AZELASTINE 1 MG/ML
1 SPRAY, METERED NASAL DAILY
Qty: 30 ML | Refills: 3 | Status: SHIPPED | OUTPATIENT
Start: 2020-01-29 | End: 2022-01-19 | Stop reason: SDUPTHER

## 2020-01-29 RX ORDER — HYDROCORTISONE 25 MG/G
CREAM TOPICAL 2 TIMES DAILY
Qty: 30 G | Refills: 1 | Status: SHIPPED | OUTPATIENT
Start: 2020-01-29 | End: 2020-09-27 | Stop reason: SDUPTHER

## 2020-02-24 ENCOUNTER — RX ONLY (OUTPATIENT)
Age: 75
Setting detail: RX ONLY
End: 2020-02-24

## 2020-02-24 RX ORDER — METRONIDAZOLE 7.5 MG/G
0.75 GEL TOPICAL QHS
Qty: 1 | Refills: 2 | Status: ERX | COMMUNITY
Start: 2020-02-24

## 2020-03-31 RX ORDER — FLUOCINONIDE 0.5 MG/G
1 CREAM TOPICAL DAILY
Qty: 30 G | Refills: 1 | Status: SHIPPED | OUTPATIENT
Start: 2020-03-31 | End: 2020-05-19 | Stop reason: SDUPTHER

## 2020-03-31 RX ORDER — MOMETASONE FUROATE 1 MG/G
1 CREAM TOPICAL DAILY
Qty: 45 G | Refills: 3 | Status: SHIPPED | OUTPATIENT
Start: 2020-03-31 | End: 2020-08-05 | Stop reason: SDUPTHER

## 2020-05-19 RX ORDER — FLUOCINONIDE 0.5 MG/G
1 CREAM TOPICAL DAILY
Qty: 30 G | Refills: 1 | Status: SHIPPED | OUTPATIENT
Start: 2020-05-19 | End: 2020-08-05 | Stop reason: SDUPTHER

## 2020-06-24 RX ORDER — KETOCONAZOLE 20 MG/ML
1 SHAMPOO, SUSPENSION TOPICAL 2 TIMES WEEKLY
Qty: 120 ML | Refills: 0 | Status: SHIPPED | OUTPATIENT
Start: 2020-06-25 | End: 2021-06-26

## 2020-06-24 RX ORDER — FLUTICASONE PROPIONATE AND SALMETEROL 100; 50 UG/1; UG/1
1 POWDER RESPIRATORY (INHALATION)
Qty: 3 EACH | Refills: 3 | Status: SHIPPED | OUTPATIENT
Start: 2020-06-24 | End: 2021-03-15

## 2020-08-05 RX ORDER — MOMETASONE FUROATE 1 MG/G
1 CREAM TOPICAL DAILY
Qty: 45 G | Refills: 3 | Status: SHIPPED | OUTPATIENT
Start: 2020-08-05 | End: 2023-01-07

## 2020-08-05 RX ORDER — FLUOCINONIDE 0.5 MG/G
1 CREAM TOPICAL DAILY
Qty: 30 G | Refills: 1 | Status: SHIPPED | OUTPATIENT
Start: 2020-08-05 | End: 2022-03-01

## 2020-09-28 RX ORDER — TRIAMCINOLONE ACETONIDE 1 MG/G
CREAM TOPICAL
COMMUNITY
Start: 2020-07-24 | End: 2022-03-01

## 2020-09-28 RX ORDER — HYDROCORTISONE 25 MG/G
CREAM TOPICAL 2 TIMES DAILY
Qty: 30 G | Refills: 1 | Status: SHIPPED | OUTPATIENT
Start: 2020-09-28 | End: 2022-03-01

## 2020-09-28 RX ORDER — METRONIDAZOLE 7.5 MG/G
CREAM TOPICAL
COMMUNITY
Start: 2020-07-27 | End: 2021-03-15

## 2020-09-28 RX ORDER — GABAPENTIN 600 MG/1
600 TABLET ORAL
COMMUNITY
Start: 2020-09-23 | End: 2021-03-15

## 2021-03-15 ENCOUNTER — OFFICE VISIT (OUTPATIENT)
Dept: INTERNAL MEDICINE | Facility: CLINIC | Age: 76
End: 2021-03-15
Payer: COMMERCIAL

## 2021-03-15 VITALS
BODY MASS INDEX: 27.1 KG/M2 | OXYGEN SATURATION: 99 % | SYSTOLIC BLOOD PRESSURE: 148 MMHG | RESPIRATION RATE: 16 BRPM | DIASTOLIC BLOOD PRESSURE: 72 MMHG | WEIGHT: 204 LBS | TEMPERATURE: 97.6 F | HEART RATE: 86 BPM

## 2021-03-15 DIAGNOSIS — Z00.00 GENERAL MEDICAL EXAM: ICD-10-CM

## 2021-03-15 DIAGNOSIS — J45.909 ASTHMA, UNSPECIFIED ASTHMA SEVERITY, UNSPECIFIED WHETHER COMPLICATED, UNSPECIFIED WHETHER PERSISTENT: Primary | ICD-10-CM

## 2021-03-15 DIAGNOSIS — L30.9 DERMATITIS: ICD-10-CM

## 2021-03-15 DIAGNOSIS — K63.5 POLYP OF COLON, UNSPECIFIED PART OF COLON, UNSPECIFIED TYPE: ICD-10-CM

## 2021-03-15 DIAGNOSIS — L30.8 PRURITIC DERMATITIS: ICD-10-CM

## 2021-03-15 PROBLEM — D84.9 IMMUNE DEFICIENCY DISORDER (CMS/HCC): Status: ACTIVE | Noted: 2020-03-01

## 2021-03-15 PROBLEM — D84.9 IMMUNE DEFICIENCY DISORDER (CMS/HCC): Status: RESOLVED | Noted: 2020-03-01 | Resolved: 2021-03-15

## 2021-03-15 PROBLEM — J45.20 MILD INTERMITTENT ASTHMA: Status: RESOLVED | Noted: 2018-05-02 | Resolved: 2021-03-15

## 2021-03-15 PROCEDURE — 99214 OFFICE O/P EST MOD 30 MIN: CPT | Performed by: INTERNAL MEDICINE

## 2021-03-15 RX ORDER — FLUTICASONE PROPIONATE 50 MCG
2 SPRAY, SUSPENSION (ML) NASAL DAILY PRN
Start: 2021-03-15 | End: 2021-04-14

## 2021-03-15 RX ORDER — FLUTICASONE PROPIONATE AND SALMETEROL 250; 50 UG/1; UG/1
1 POWDER RESPIRATORY (INHALATION) 2 TIMES DAILY
Qty: 60 EACH | Refills: 0 | Status: SHIPPED | OUTPATIENT
Start: 2021-03-15 | End: 2021-06-08 | Stop reason: SDUPTHER

## 2021-03-15 RX ORDER — FOLIC ACID 1 MG/1
TABLET ORAL
COMMUNITY
Start: 2021-03-02 | End: 2021-07-10

## 2021-03-15 RX ORDER — METHOTREXATE 2.5 MG/1
25 TABLET ORAL WEEKLY
COMMUNITY
End: 2021-07-10

## 2021-03-15 ASSESSMENT — ENCOUNTER SYMPTOMS
ABDOMINAL PAIN: 0
FEVER: 0
CHILLS: 0

## 2021-03-15 ASSESSMENT — PATIENT HEALTH QUESTIONNAIRE - PHQ9: SUM OF ALL RESPONSES TO PHQ9 QUESTIONS 1 & 2: 0

## 2021-03-15 NOTE — PROGRESS NOTES
Chief Complaint   Patient presents with   • Follow-up       Subjective      Patient ID: Zaki Wilder is a 75 y.o. male.    Asthma: Uncontrolled.  Needs to take albuterol inhaler more than twice a week sometimes.    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  Now on methotrexate.  Improving.    Colon polyp: Had on colonoscopy in 2013.  States he did not do well after colonoscopy as his bowel movements were abnormal for quite some time.  Is not interested in any colonoscopy currently      The following have been reviewed and updated as appropriate in this visit:  Allergies  Meds  Problems        Review of Systems   Constitutional: Negative for chills and fever.   Cardiovascular: Negative for chest pain.   Gastrointestinal: Negative for abdominal pain.         Current Outpatient Medications:   •  azelastine (ASTELIN) 137 mcg (0.1 %) nasal spray, Administer 1 spray into each nostril daily., Disp: 30 mL, Rfl: 3  •  clobetasol (TEMOVATE) 0.05 % cream, Apply to affected area as daily as needed, Disp: 45 g, Rfl: 1  •  fluocinonide (LIDEX) 0.05 % cream, Apply 1 application topically daily., Disp: 30 g, Rfl: 1  •  fluticasone propionate (FLONASE) 50 mcg/actuation nasal spray, Administer 2 sprays into each nostril daily as needed for rhinitis., Disp: , Rfl:   •  folic acid (FOLVITE) 1 mg tablet, take 1 tablet by mouth once daily EXCEPT ON DAY METHOTREXATE IS TAKEN, Disp: , Rfl:   •  ketoconazole (NIZORAL) 2 % cream, Apply 2 % topically daily., Disp: , Rfl:   •  ketoconazole (NIZORAL) 2 % shampoo, Apply 1 application topically 2 (two) times a week (Mon, Thu)., Disp: 120 mL, Rfl: 0  •  methotrexate 2.5 mg tablet, Take  25 mg once a week  , Disp: , Rfl:   •  mometasone (ELOCON) 0.1 % cream, Apply 1 application topically daily., Disp: 45 g, Rfl: 3  •  tacrolimus (PROTOPIC) 0.1 % ointment, Apply 0.1 % topically daily., Disp: , Rfl:   •  triamcinolone (KENALOG) 0.1 % cream, apply to affected area one to  two times a day for up to 2 weeks for itching, Disp: , Rfl:   •  fluticasone propion-salmeteroL (ADVAIR DISKUS) 250-50 mcg/dose diskus inhaler, Inhale 1 puff 2 (two) times a day. Rinse mouth with water after use., Disp: 60 each, Rfl: 0  •  hydrocortisone 2.5 % cream, Apply topically 2 (two) times a day., Disp: 30 g, Rfl: 1    Objective     Vitals:    03/15/21 0813   BP: (!) 148/72   BP Location: Left upper arm   Patient Position: Sitting   Pulse: 86   Resp: 16   Temp: 36.4 °C (97.6 °F)   TempSrc: Temporal   SpO2: 99%   Weight: 92.5 kg (204 lb)     Physical Exam  Vitals signs reviewed.   Constitutional:       General: He is not in acute distress.     Appearance: He is well-developed.   HENT:      Head: Normocephalic and atraumatic.   Eyes:      Conjunctiva/sclera: Conjunctivae normal.   Cardiovascular:      Rate and Rhythm: Normal rate and regular rhythm.   Pulmonary:      Effort: Pulmonary effort is normal.      Breath sounds: Normal breath sounds.   Abdominal:      General: There is no distension.      Palpations: Abdomen is soft.      Tenderness: There is no abdominal tenderness.   Musculoskeletal:         General: No swelling.   Skin:     Comments: Erythematous rash on chest, antecubital fossa   Neurological:      Mental Status: He is alert. Mental status is at baseline.   Psychiatric:         Mood and Affect: Mood normal.         Behavior: Behavior normal.           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  Gets lab works routinely  -Continue folic acid    Colon polyp  Noted on colonoscopy in 2013.  States he had abnormal bowel habits after getting a colonoscopy and not interested in another colonoscopy currently    General medical exam  Check labs      Orders Placed This Encounter   Procedures   • CBC and Differential   • Comprehensive  metabolic panel   • Lipid panel   • TSH w reflex FT4     New Medications Ordered This Visit   Medications   • folic acid (FOLVITE) 1 mg tablet     Sig: take 1 tablet by mouth once daily EXCEPT ON DAY METHOTREXATE IS TAKEN   • methotrexate 2.5 mg tablet     Sig: Take  25 mg once a week     • fluticasone propion-salmeteroL (ADVAIR DISKUS) 250-50 mcg/dose diskus inhaler     Sig: Inhale 1 puff 2 (two) times a day. Rinse mouth with water after use.     Dispense:  60 each     Refill:  0   • fluticasone propionate (FLONASE) 50 mcg/actuation nasal spray     Sig: Administer 2 sprays into each nostril daily as needed for rhinitis.       Return in about 6 months (around 9/15/2021).     LATOYA GRANDA MD

## 2021-03-15 NOTE — PATIENT INSTRUCTIONS
Asthma  Increase advair to 250mcg-50mcg  Monitor response      Orders Placed This Encounter   Procedures   • CBC and Differential   • Comprehensive metabolic panel   • Lipid panel   • TSH w reflex FT4     New Medications Ordered This Visit   Medications   • folic acid (FOLVITE) 1 mg tablet     Sig: take 1 tablet by mouth once daily EXCEPT ON DAY METHOTREXATE IS TAKEN   • methotrexate 2.5 mg tablet     Sig: Take  25 mg once a week     • fluticasone propion-salmeteroL (ADVAIR DISKUS) 250-50 mcg/dose diskus inhaler     Sig: Inhale 1 puff 2 (two) times a day. Rinse mouth with water after use.     Dispense:  60 each     Refill:  0   • fluticasone propionate (FLONASE) 50 mcg/actuation nasal spray     Sig: Administer 2 sprays into each nostril daily as needed for rhinitis.       Return in about 6 months (around 9/15/2021).     LATOYA GRANDA MD

## 2021-03-15 NOTE — ASSESSMENT & PLAN NOTE
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  Gets lab works routinely  -Continue folic acid

## 2021-03-15 NOTE — ASSESSMENT & PLAN NOTE
Noted on colonoscopy in 2013.  States he had abnormal bowel habits after getting a colonoscopy and not interested in another colonoscopy currently

## 2021-03-25 ENCOUNTER — IMMUNIZATION (OUTPATIENT)
Dept: IMMUNIZATION | Facility: CLINIC | Age: 76
End: 2021-03-25

## 2021-03-30 LAB
ALBUMIN SERPL-MCNC: 4.3 G/DL (ref 3.7–4.7)
ALBUMIN/GLOB SERPL: 2 {RATIO} (ref 1.2–2.2)
ALP SERPL-CCNC: 68 IU/L (ref 39–117)
ALT SERPL-CCNC: 21 IU/L (ref 0–44)
AST SERPL-CCNC: 20 IU/L (ref 0–40)
BASOPHILS # BLD AUTO: 0.1 X10E3/UL (ref 0–0.2)
BASOPHILS NFR BLD AUTO: 1 %
BILIRUB SERPL-MCNC: 0.4 MG/DL (ref 0–1.2)
BUN SERPL-MCNC: 10 MG/DL (ref 8–27)
BUN/CREAT SERPL: 11 (ref 10–24)
CALCIUM SERPL-MCNC: 9.4 MG/DL (ref 8.6–10.2)
CHLORIDE SERPL-SCNC: 102 MMOL/L (ref 96–106)
CHOLEST SERPL-MCNC: 174 MG/DL (ref 100–199)
CO2 SERPL-SCNC: 24 MMOL/L (ref 20–29)
CREAT SERPL-MCNC: 0.88 MG/DL (ref 0.76–1.27)
EOSINOPHIL # BLD AUTO: 0.5 X10E3/UL (ref 0–0.4)
EOSINOPHIL NFR BLD AUTO: 5 %
ERYTHROCYTE [DISTWIDTH] IN BLOOD BY AUTOMATED COUNT: 13.1 % (ref 11.6–15.4)
GLOBULIN SER CALC-MCNC: 2.2 G/DL (ref 1.5–4.5)
GLUCOSE SERPL-MCNC: 59 MG/DL (ref 65–99)
HCT VFR BLD AUTO: 48.2 % (ref 37.5–51)
HDLC SERPL-MCNC: 52 MG/DL
HGB BLD-MCNC: 16 G/DL (ref 13–17.7)
IMM GRANULOCYTES # BLD AUTO: 0.1 X10E3/UL (ref 0–0.1)
IMM GRANULOCYTES NFR BLD AUTO: 1 %
LAB CORP EGFR IF AFRICN AM: 99 ML/MIN/1.73
LAB CORP EGFR IF NONAFRICN AM: 85 ML/MIN/1.73
LDLC SERPL CALC-MCNC: 103 MG/DL (ref 0–99)
LYMPHOCYTES # BLD AUTO: 1.9 X10E3/UL (ref 0.7–3.1)
LYMPHOCYTES NFR BLD AUTO: 20 %
MCH RBC QN AUTO: 31.3 PG (ref 26.6–33)
MCHC RBC AUTO-ENTMCNC: 33.2 G/DL (ref 31.5–35.7)
MCV RBC AUTO: 94 FL (ref 79–97)
MONOCYTES # BLD AUTO: 0.8 X10E3/UL (ref 0.1–0.9)
MONOCYTES NFR BLD AUTO: 9 %
NEUTROPHILS # BLD AUTO: 6.1 X10E3/UL (ref 1.4–7)
NEUTROPHILS NFR BLD AUTO: 64 %
PLATELET # BLD AUTO: 226 X10E3/UL (ref 150–450)
POTASSIUM SERPL-SCNC: 4.6 MMOL/L (ref 3.5–5.2)
PROT SERPL-MCNC: 6.5 G/DL (ref 6–8.5)
RBC # BLD AUTO: 5.12 X10E6/UL (ref 4.14–5.8)
SODIUM SERPL-SCNC: 140 MMOL/L (ref 134–144)
TRIGL SERPL-MCNC: 106 MG/DL (ref 0–149)
TSH SERPL DL<=0.005 MIU/L-ACNC: 1.94 UIU/ML (ref 0.45–4.5)
VLDLC SERPL CALC-MCNC: 19 MG/DL (ref 5–40)
WBC # BLD AUTO: 9.5 X10E3/UL (ref 3.4–10.8)

## 2021-04-28 ENCOUNTER — IMMUNIZATION (OUTPATIENT)
Dept: IMMUNIZATION | Facility: CLINIC | Age: 76
End: 2021-04-28
Attending: FAMILY MEDICINE

## 2021-06-07 RX ORDER — DUPILUMAB 300 MG/2ML
INJECTION, SOLUTION SUBCUTANEOUS
COMMUNITY
Start: 2021-05-31 | End: 2022-09-13

## 2021-06-07 RX ORDER — ALBUTEROL SULFATE 90 UG/1
1 INHALANT RESPIRATORY (INHALATION) 3 TIMES DAILY PRN
Qty: 18 G | Refills: 0 | Status: SHIPPED | OUTPATIENT
Start: 2021-06-07 | End: 2021-08-24

## 2021-06-07 RX ORDER — FLUTICASONE PROPIONATE AND SALMETEROL 100; 50 UG/1; UG/1
1 POWDER RESPIRATORY (INHALATION)
COMMUNITY
End: 2021-06-07 | Stop reason: SDUPTHER

## 2021-06-07 RX ORDER — ALBUTEROL SULFATE 90 UG/1
INHALANT RESPIRATORY (INHALATION)
COMMUNITY
Start: 2017-01-01 | End: 2021-06-07 | Stop reason: SDUPTHER

## 2021-06-07 RX ORDER — FLUTICASONE PROPIONATE AND SALMETEROL 100; 50 UG/1; UG/1
1 POWDER RESPIRATORY (INHALATION)
Qty: 60 EACH | Refills: 0 | Status: SHIPPED | OUTPATIENT
Start: 2021-06-07 | End: 2021-06-08 | Stop reason: SDUPTHER

## 2021-06-07 RX ORDER — PREDNISONE 10 MG/1
TABLET ORAL
COMMUNITY
Start: 2021-06-02 | End: 2021-07-10

## 2021-06-08 RX ORDER — FLUTICASONE PROPIONATE AND SALMETEROL 100; 50 UG/1; UG/1
1 POWDER RESPIRATORY (INHALATION)
Qty: 60 EACH | Refills: 0 | Status: SHIPPED | OUTPATIENT
Start: 2021-06-08 | End: 2021-06-09 | Stop reason: CLARIF

## 2021-06-08 NOTE — TELEPHONE ENCOUNTER
From: Zaki Wilder  To: Office of Chuck Ahuja DO  Sent: 6/8/2021 4:33 PM EDT  Subject: Medication Renewal Request    Refills have been requested for the following medications:   Other - please refill my ADVAIR 100/50 thanks ...    Preferred pharmacy: [facility] - [place] [address]  Delivery method: Pickup

## 2021-06-09 RX ORDER — CEPHALEXIN 250 MG/1
1 CAPSULE ORAL
Qty: 60 EACH | Refills: 1 | Status: SHIPPED | OUTPATIENT
Start: 2021-06-09 | End: 2024-09-23

## 2021-07-10 ENCOUNTER — HOSPITAL ENCOUNTER (OUTPATIENT)
Facility: CLINIC | Age: 76
Discharge: HOME | End: 2021-07-10
Attending: FAMILY MEDICINE
Payer: COMMERCIAL

## 2021-07-10 ENCOUNTER — HOSPITAL ENCOUNTER (OUTPATIENT)
Facility: CLINIC | Age: 76
Discharge: STILL ADMITTED OR ADMINISTRATIVE | End: 2021-07-10
Payer: COMMERCIAL

## 2021-07-10 ENCOUNTER — HOSPITAL ENCOUNTER (OUTPATIENT)
Facility: CLINIC | Age: 76
Discharge: LEFT BEFORE DISCHARGE | End: 2021-07-10
Payer: COMMERCIAL

## 2021-07-10 VITALS
SYSTOLIC BLOOD PRESSURE: 126 MMHG | DIASTOLIC BLOOD PRESSURE: 84 MMHG | OXYGEN SATURATION: 98 % | HEART RATE: 88 BPM | TEMPERATURE: 97.2 F

## 2021-07-10 DIAGNOSIS — R21 RASH: Primary | ICD-10-CM

## 2021-07-10 PROCEDURE — 99212 OFFICE O/P EST SF 10 MIN: CPT | Performed by: FAMILY MEDICINE

## 2021-07-10 RX ORDER — FLUOCINOLONE ACETONIDE 0.25 MG/G
OINTMENT TOPICAL
COMMUNITY
Start: 2021-07-01 | End: 2022-03-01

## 2021-07-10 RX ORDER — PREDNISONE 20 MG/1
40 TABLET ORAL DAILY
Qty: 6 TABLET | Refills: 0 | Status: SHIPPED | OUTPATIENT
Start: 2021-07-10 | End: 2021-08-24

## 2021-07-10 NOTE — ED PROVIDER NOTES
"History  Chief Complaint   Patient presents with   • Rash     Pt reports long history of rashes and following chronically with Derm as well as allergy.  Next Allergy appt in 3 days.  Pt reports new rash on extremities in the past 24 hours.  He started a topical steroid yesterday and rash resolved.  Returned this AM and rash again resolved with topical.  Patient requesting prednisone orally.  No rash currently.          Past Medical History:   Diagnosis Date   • Mild intermittent asthma 5/2/2018       No past surgical history on file.    No family history on file.    Social History     Tobacco Use   • Smoking status: Never Smoker   • Smokeless tobacco: Never Used   Substance Use Topics   • Alcohol use: Yes     Alcohol/week: 7.0 standard drinks     Types: 7 Standard drinks or equivalent per week     Comment: daily   • Drug use: No       Review of Systems    Physical Exam  ED Triage Vitals [07/10/21 1035]   Temp Heart Rate Resp BP SpO2   36.2 °C (97.2 °F) 88 -- 126/84 98 %      Temp Source Heart Rate Source Patient Position BP Location FiO2 (%) (Set)   Tympanic -- -- Left upper arm --       Physical Exam  Constitutional:       Appearance: Normal appearance.   Cardiovascular:      Rate and Rhythm: Normal rate and regular rhythm.   Skin:     Comments: No rash   Neurological:      Mental Status: He is alert.           Procedures  Procedures    UC Course  Clinical Impressions as of Jul 10 1044   Rash       MDM  Number of Diagnoses or Management Options  Rash  Diagnosis management comments: Pt with chronic skin rash  No rash currently as controlled with topical treatment, I recommend continue topical treatment and follow up if uncontrolled with this as safer than oral steroid course particularly as he has required oral steroids on multiple occasions.  Pt reports he is \"very disappointed in me\" in not rx-ing oral steroid.  I apologized that he feels this way and explained that I feel the most appropriate and safest course is " as outlined.  I         Addendum:Pt return to office with rash no all over his torso.  He did not call his allergist for recommendations.  Will write for steroid burst over 3 days and follow up with Derm as scheduled on Wednesdya                 Luis Mckeon, DO  07/10/21 7270       Luis Mckeon, DO  07/10/21 5100

## 2021-07-23 ENCOUNTER — TRANSCRIBE ORDERS (OUTPATIENT)
Dept: SCHEDULING | Age: 76
End: 2021-07-23

## 2021-07-23 DIAGNOSIS — R05.9 COUGH: Primary | ICD-10-CM

## 2021-07-27 ENCOUNTER — HOSPITAL ENCOUNTER (OUTPATIENT)
Dept: RADIOLOGY | Age: 76
Discharge: HOME | End: 2021-07-27
Attending: NURSE PRACTITIONER
Payer: COMMERCIAL

## 2021-07-27 DIAGNOSIS — R05.9 COUGH: ICD-10-CM

## 2021-07-27 PROCEDURE — 71046 X-RAY EXAM CHEST 2 VIEWS: CPT

## 2021-07-28 ENCOUNTER — TELEPHONE (OUTPATIENT)
Dept: INTERNAL MEDICINE | Facility: CLINIC | Age: 76
End: 2021-07-28

## 2021-07-28 RX ORDER — HYDROXYZINE HYDROCHLORIDE 10 MG/1
TABLET, FILM COATED ORAL
COMMUNITY
Start: 2021-07-12 | End: 2022-03-01

## 2021-07-28 RX ORDER — OMEPRAZOLE 40 MG/1
CAPSULE, DELAYED RELEASE ORAL
COMMUNITY
Start: 2021-07-06 | End: 2021-08-24

## 2021-07-28 NOTE — TELEPHONE ENCOUNTER
Charity a nurse practitioner from [facility]. Called on behalf of this pt stating that they have been following his eczema for a while and they did some routine lab work and chest xray to see why he was having some of these break outs, however the results found a nodule in his lung. They stated that they did not know how you wanted to proceed. They would like for you to give them a call to see what you would like for the pt to do.     Charity will be at the [facility] location today until 7pm.     # [phone]     Charity's personal cell # [phone]

## 2021-07-28 NOTE — TELEPHONE ENCOUNTER
Spoke to NP at Dermatology office.  They will call him with Chest X-ray result  I gave NP Dr. Hooks as Pulmonary to follow with  Did you want him to get a CT?

## 2021-07-29 NOTE — TELEPHONE ENCOUNTER
Spoke to pt and discussed. He has made appt with pulm and he will await their recommendations first.

## 2021-08-05 NOTE — TELEPHONE ENCOUNTER
From: Zaki Wilder  Sent: 1/28/2020 3:11 PM EST  Subject: Medication Renewal Request    Zaki Wilder would like a refill of the following medications:   Other - Hydrocortisone cream 2.5%    Preferred pharmacy: [facility] - [place] [address]  Delivery method: Pickup      Medication renewals requested in this message routed separately:     azelastine (ASTELIN) 137 mcg (0.1 %) nasal spray [Chuck Ahuja, DO]     Patient has upcoming appointment 9/3/21 w/ PCP. Prescription approved per [facility] Refill Protocol.  Roberto Carlos VERAS RN

## 2021-08-24 ENCOUNTER — OFFICE VISIT (OUTPATIENT)
Dept: INTERNAL MEDICINE | Facility: CLINIC | Age: 76
End: 2021-08-24
Payer: COMMERCIAL

## 2021-08-24 VITALS
RESPIRATION RATE: 16 BRPM | SYSTOLIC BLOOD PRESSURE: 140 MMHG | TEMPERATURE: 98.1 F | DIASTOLIC BLOOD PRESSURE: 80 MMHG | OXYGEN SATURATION: 93 % | BODY MASS INDEX: 25.9 KG/M2 | HEART RATE: 95 BPM | WEIGHT: 195 LBS

## 2021-08-24 DIAGNOSIS — G89.29 CHRONIC PAIN OF LEFT KNEE: ICD-10-CM

## 2021-08-24 DIAGNOSIS — R63.4 WEIGHT LOSS: ICD-10-CM

## 2021-08-24 DIAGNOSIS — M25.562 CHRONIC PAIN OF LEFT KNEE: ICD-10-CM

## 2021-08-24 DIAGNOSIS — L30.9 DERMATITIS: ICD-10-CM

## 2021-08-24 DIAGNOSIS — K21.9 GASTROESOPHAGEAL REFLUX DISEASE WITHOUT ESOPHAGITIS: ICD-10-CM

## 2021-08-24 DIAGNOSIS — J45.909 ASTHMA, UNSPECIFIED ASTHMA SEVERITY, UNSPECIFIED WHETHER COMPLICATED, UNSPECIFIED WHETHER PERSISTENT: Primary | ICD-10-CM

## 2021-08-24 PROCEDURE — 3008F BODY MASS INDEX DOCD: CPT | Performed by: INTERNAL MEDICINE

## 2021-08-24 PROCEDURE — 99214 OFFICE O/P EST MOD 30 MIN: CPT | Performed by: INTERNAL MEDICINE

## 2021-08-24 NOTE — ASSESSMENT & PLAN NOTE
Intentionally wanting to lose weight.  Has changed his diet.  Exercises regularly  · Continue to monitor at home.   · Please call back if continue to lose weight. May need to do further work up if more weight loss

## 2021-08-24 NOTE — ASSESSMENT & PLAN NOTE
Complaining of left knee pain  Worse in the morning for several minutes and then it improves  · Likely arthritis.   · Can check Xray

## 2021-08-24 NOTE — PROGRESS NOTES
Chief Complaint   Patient presents with   • Other     discuss asthma        Subjective      Patient ID: Zaki Wilder is a 76 y.o. male.    HPI    Rash: Was worse.  Recently had Medrol Dosepak which helps some.  Methotrexate stopped and now on Dupixent    GERD: Was given pantoprazole and then started to have hives.  Not taking Pepcid.    Asthma: Increase in Advair dose did not help so he reduced it to his previous dose.  Breathing has been well controlled.  Not taking rescue inhaler much    Left knee pain: Only worse in the morning after getting up for several minutes.  Once he starts to move around a lot, pain improves  The following have been reviewed and updated as appropriate in this visit:  Allergies  Meds  Problems        Review of Systems   Constitutional: Negative for chills and fever.   Cardiovascular: Negative for chest pain.   Gastrointestinal: Negative for abdominal pain.         Current Outpatient Medications:   •  ADVAIR DISKUS 100-50 mcg/dose diskus inhaler, Inhale 1 puff 2 (two) times a day., Disp: 60 each, Rfl: 1  •  albuterol HFA (VENTOLIN HFA) 90 mcg/actuation inhaler, Inhale 2 puffs every 6 (six) hours as needed for wheezing., Disp: 1 Inhaler, Rfl: 11  •  azelastine (ASTELIN) 137 mcg (0.1 %) nasal spray, Administer 1 spray into each nostril daily., Disp: 30 mL, Rfl: 3  •  clobetasol (TEMOVATE) 0.05 % cream, Apply to affected area as daily as needed, Disp: 45 g, Rfl: 1  •  DUPIXENT  mg/2 mL pen injector, , Disp: , Rfl:   •  fluocinolone (SYNALAR) 0.025 % ointment, apply to affected area twice a day, Disp: , Rfl:   •  hydrOXYzine (ATARAX) 10 mg tablet, take 1 tablet by mouth two to three times a day for itching, Disp: , Rfl:   •  ketoconazole (NIZORAL) 2 % cream, Apply 2 % topically daily., Disp: , Rfl:   •  tacrolimus (PROTOPIC) 0.1 % ointment, Apply 0.1 % topically daily., Disp: , Rfl:   •  triamcinolone (KENALOG) 0.1 % cream, apply to affected area one to two times a day for up to 2  weeks for itching, Disp: , Rfl:   •  fluocinonide (LIDEX) 0.05 % cream, Apply 1 application topically daily., Disp: 30 g, Rfl: 1  •  fluticasone propionate (FLONASE) 50 mcg/actuation nasal spray, Administer 2 sprays into each nostril daily as needed for rhinitis., Disp: , Rfl:   •  hydrocortisone 2.5 % cream, Apply topically 2 (two) times a day., Disp: 30 g, Rfl: 1  •  mometasone (ELOCON) 0.1 % cream, Apply 1 application topically daily., Disp: 45 g, Rfl: 3    Objective     Vitals:    08/24/21 1415   BP: 140/80   BP Location: Left upper arm   Patient Position: Sitting   Pulse: 95   Resp: 16   Temp: 36.7 °C (98.1 °F)   TempSrc: Oral   SpO2: 93%   Weight: 88.5 kg (195 lb)     Physical Exam  Vitals reviewed.   Constitutional:       General: He is not in acute distress.     Appearance: He is well-developed.   HENT:      Head: Normocephalic and atraumatic.   Eyes:      Conjunctiva/sclera: Conjunctivae normal.   Cardiovascular:      Rate and Rhythm: Normal rate and regular rhythm.   Pulmonary:      Effort: Pulmonary effort is normal.      Breath sounds: Normal breath sounds.   Abdominal:      General: There is no distension.      Palpations: Abdomen is soft.      Tenderness: There is no abdominal tenderness.   Musculoskeletal:         General: No swelling.   Neurological:      Mental Status: He is alert. Mental status is at baseline.   Psychiatric:         Mood and Affect: Mood normal.         Behavior: Behavior normal.           Assessment/Plan   Asthma  Advair 250 did not help much  Symptoms well controlled now  · Continue ADVAIR 100-50mcg   · Continue albuterol as needed  · Has appointment with pulmonology    Gastroesophageal reflux disease without esophagitis  Had hives from PPI  Sometimes has symptoms intermittently  · Increase zantac to twice a day and monitor    Chronic pain of left knee  Complaining of left knee pain  Worse in the morning for several minutes and then it improves  · Likely arthritis.   · Can check  Xray     Weight loss  Intentionally wanting to lose weight.  Has changed his diet.  Exercises regularly  · Continue to monitor at home.   · Please call back if continue to lose weight. May need to do further work up if more weight loss     Dermatitis  Previous biopsy with lymphomatoid papulosis  Off methotrexate now and on Dupixent  Suture from right arm removed today  · Continue to follow with dermatology      Orders Placed This Encounter   Procedures   • X-RAY KNEE LEFT 4+ VIEWS     No orders of the defined types were placed in this encounter.      Return in about 6 months (around 2/24/2022).     LATOYA GRANDA MD

## 2021-08-24 NOTE — PATIENT INSTRUCTIONS
Asthma  Continue ADVAIR 100-50mcg   Continue albuterol as needed    Gastroesophageal reflux disease without esophagitis  Increase zantac to twice a day and monitor    Chronic pain of left knee  Likely arthritis.   Can check Xray     Weight loss    Continue to monitor at home.   Please call back if continue to lose weight. May need to do further work up if more weight loss       Orders Placed This Encounter   Procedures   • X-RAY KNEE LEFT 4+ VIEWS     No orders of the defined types were placed in this encounter.      Return in about 6 months (around 2/24/2022).     LATOYA GRANDA MD

## 2021-08-24 NOTE — ASSESSMENT & PLAN NOTE
Advair 250 did not help much  Symptoms well controlled now  · Continue ADVAIR 100-50mcg   · Continue albuterol as needed  · Has appointment with pulmonology

## 2021-08-24 NOTE — ASSESSMENT & PLAN NOTE
Had hives from PPI  Sometimes has symptoms intermittently  · Increase zantac to twice a day and monitor

## 2021-08-25 ASSESSMENT — ENCOUNTER SYMPTOMS
FEVER: 0
CHILLS: 0
ABDOMINAL PAIN: 0

## 2021-08-25 NOTE — ASSESSMENT & PLAN NOTE
Previous biopsy with lymphomatoid papulosis  Off methotrexate now and on Dupixent  Suture from right arm removed today  · Continue to follow with dermatology

## 2021-09-09 ENCOUNTER — TRANSCRIBE ORDERS (OUTPATIENT)
Dept: SCHEDULING | Age: 76
End: 2021-09-09
Payer: COMMERCIAL

## 2021-09-09 DIAGNOSIS — R91.8 OTHER NONSPECIFIC ABNORMAL FINDING OF LUNG FIELD: Primary | ICD-10-CM

## 2021-09-21 ENCOUNTER — HOSPITAL ENCOUNTER (OUTPATIENT)
Dept: RADIOLOGY | Age: 76
Discharge: HOME | End: 2021-09-21
Attending: INTERNAL MEDICINE
Payer: COMMERCIAL

## 2021-09-21 DIAGNOSIS — M25.562 CHRONIC PAIN OF LEFT KNEE: ICD-10-CM

## 2021-09-21 DIAGNOSIS — G89.29 CHRONIC PAIN OF LEFT KNEE: ICD-10-CM

## 2021-09-21 DIAGNOSIS — R91.8 OTHER NONSPECIFIC ABNORMAL FINDING OF LUNG FIELD: ICD-10-CM

## 2021-09-21 PROCEDURE — 73564 X-RAY EXAM KNEE 4 OR MORE: CPT | Mod: LT

## 2021-09-21 PROCEDURE — 71250 CT THORAX DX C-: CPT

## 2022-01-21 RX ORDER — ALBUTEROL SULFATE 90 UG/1
2 INHALANT RESPIRATORY (INHALATION) EVERY 6 HOURS PRN
Qty: 18 G | Refills: 1 | Status: SHIPPED | OUTPATIENT
Start: 2022-01-21 | End: 2024-07-08 | Stop reason: SDUPTHER

## 2022-01-21 RX ORDER — AZELASTINE 1 MG/ML
1 SPRAY, METERED NASAL DAILY
Qty: 30 ML | Refills: 11 | Status: SHIPPED | OUTPATIENT
Start: 2022-01-21

## 2022-01-21 NOTE — TELEPHONE ENCOUNTER
From: Zaki Wilder  To: Office of LATOYA GRANDA MD  Sent: 1/19/2022 8:49 AM EST  Subject: Medication Renewal Request    Refills have been requested for the following medications:   Other - albuterol HFA 90 mcg/actuation inhaler    Preferred pharmacy: [facility] [place] [address]  Delivery method: Pickup      Medication renewals requested in this message routed separately:      albuterol HFA (VENTOLIN HFA) 90 mcg/actuation inhaler [Chuck Ahuja, DO]     azelastine (ASTELIN) 137 mcg (0.1 %) nasal spray [Chuck Ahuja, DO]

## 2022-03-01 ENCOUNTER — OFFICE VISIT (OUTPATIENT)
Dept: INTERNAL MEDICINE | Facility: CLINIC | Age: 77
End: 2022-03-01
Payer: COMMERCIAL

## 2022-03-01 ENCOUNTER — TELEPHONE (OUTPATIENT)
Dept: INTERNAL MEDICINE | Facility: CLINIC | Age: 77
End: 2022-03-01

## 2022-03-01 VITALS
TEMPERATURE: 97.9 F | WEIGHT: 195 LBS | HEART RATE: 79 BPM | OXYGEN SATURATION: 99 % | BODY MASS INDEX: 25.9 KG/M2 | SYSTOLIC BLOOD PRESSURE: 124 MMHG | RESPIRATION RATE: 16 BRPM | DIASTOLIC BLOOD PRESSURE: 72 MMHG

## 2022-03-01 DIAGNOSIS — K21.9 GASTROESOPHAGEAL REFLUX DISEASE WITHOUT ESOPHAGITIS: ICD-10-CM

## 2022-03-01 DIAGNOSIS — M47.816 LUMBAR SPONDYLOSIS: ICD-10-CM

## 2022-03-01 DIAGNOSIS — K63.5 POLYP OF COLON, UNSPECIFIED PART OF COLON, UNSPECIFIED TYPE: ICD-10-CM

## 2022-03-01 DIAGNOSIS — J45.909 ASTHMA, UNSPECIFIED ASTHMA SEVERITY, UNSPECIFIED WHETHER COMPLICATED, UNSPECIFIED WHETHER PERSISTENT: Primary | ICD-10-CM

## 2022-03-01 DIAGNOSIS — R63.4 WEIGHT LOSS: ICD-10-CM

## 2022-03-01 PROCEDURE — 99214 OFFICE O/P EST MOD 30 MIN: CPT | Performed by: INTERNAL MEDICINE

## 2022-03-01 PROCEDURE — 3008F BODY MASS INDEX DOCD: CPT | Performed by: INTERNAL MEDICINE

## 2022-03-01 NOTE — PATIENT INSTRUCTIONS
Lumbar spondylosis  Stable.     Gastroesophageal reflux disease without esophagitis  Continue 2 tums as needed    Asthma  Continue advair  Check routine labs      Return in about 6 months (around 9/1/2022).

## 2022-03-01 NOTE — TELEPHONE ENCOUNTER
Patient would like to know if you wanted the lab scheduled for now or in September.    Clarification is needed.    Thank you

## 2022-03-01 NOTE — PROGRESS NOTES
Chief Complaint   Patient presents with   • Other     6 months follow up       Subjective      Patient ID: Zaki Wilder is a 76 y.o. male.    HPI     Asthma: Well-controlled    Back pain: Controlled.  Goes to chiropractor which helps    GERD: Stable    The following have been reviewed and updated as appropriate in this visit:   Allergies  Meds  Problems        Review of Systems   Constitutional: Negative for chills and fever.   Cardiovascular: Negative for chest pain.   Gastrointestinal: Negative for abdominal pain.         Current Outpatient Medications:   •  ADVAIR DISKUS 100-50 mcg/dose diskus inhaler, Inhale 1 puff 2 (two) times a day., Disp: 60 each, Rfl: 1  •  albuterol HFA 90 mcg/actuation inhaler, Inhale 2 puffs every 6 (six) hours as needed for wheezing., Disp: 18 g, Rfl: 1  •  azelastine 137 mcg (0.1 %) nasal spray, Administer 1 spray into each nostril daily., Disp: 30 mL, Rfl: 11  •  clobetasol (TEMOVATE) 0.05 % cream, Apply to affected area as daily as needed, Disp: 45 g, Rfl: 1  •  DUPIXENT  mg/2 mL pen injector, , Disp: , Rfl:   •  tacrolimus (PROTOPIC) 0.1 % ointment, Apply 0.1 % topically daily., Disp: , Rfl:   •  fluticasone propionate (FLONASE) 50 mcg/actuation nasal spray, Administer 2 sprays into each nostril daily as needed for rhinitis., Disp: , Rfl:   •  ketoconazole (NIZORAL) 2 % cream, Apply 2 % topically daily., Disp: , Rfl:   •  mometasone (ELOCON) 0.1 % cream, Apply 1 application topically daily., Disp: 45 g, Rfl: 3    Objective     Vitals:    03/01/22 1410   BP: 124/72   BP Location: Left upper arm   Patient Position: Sitting   Pulse: 79   Resp: 16   Temp: 36.6 °C (97.9 °F)   TempSrc: Oral   SpO2: 99%   Weight: 88.5 kg (195 lb)     Physical Exam  Vitals reviewed.   Constitutional:       General: He is not in acute distress.     Appearance: He is well-developed.   HENT:      Head: Normocephalic and atraumatic.   Eyes:      Conjunctiva/sclera: Conjunctivae normal.    Cardiovascular:      Rate and Rhythm: Normal rate and regular rhythm.   Pulmonary:      Effort: Pulmonary effort is normal.      Breath sounds: Normal breath sounds.   Abdominal:      General: There is no distension.      Palpations: Abdomen is soft.      Tenderness: There is no abdominal tenderness.   Musculoskeletal:         General: No swelling.   Neurological:      Mental Status: He is alert. Mental status is at baseline.   Psychiatric:         Mood and Affect: Mood normal.         Behavior: Behavior normal.           Assessment/Plan   Lumbar spondylosis  Stable.     Gastroesophageal reflux disease without esophagitis  Controlled  Continue 2 tums as needed    Asthma  · Continue advair  · Check routine labs    Colon polyp  Discussed repeat colonoscopy but he does not want any repeat colonoscopies as his bowel movements were altered for very long time after colonoscopy prep    Weight loss  Has stabilized now      Orders Placed This Encounter   Procedures   • CBC and Differential   • Comprehensive metabolic panel   • TSH w reflex FT4     No orders of the defined types were placed in this encounter.      Return in about 6 months (around 9/1/2022).     LATOYA GRANDA MD

## 2022-03-02 ASSESSMENT — ENCOUNTER SYMPTOMS
FEVER: 0
CHILLS: 0
ABDOMINAL PAIN: 0

## 2022-03-02 NOTE — ASSESSMENT & PLAN NOTE
Discussed repeat colonoscopy but he does not want any repeat colonoscopies as his bowel movements were altered for very long time after colonoscopy prep

## 2022-03-10 ENCOUNTER — CLINICAL SUPPORT (OUTPATIENT)
Dept: INTERNAL MEDICINE | Facility: CLINIC | Age: 77
End: 2022-03-10
Payer: COMMERCIAL

## 2022-03-10 DIAGNOSIS — J45.909 ASTHMA, UNSPECIFIED ASTHMA SEVERITY, UNSPECIFIED WHETHER COMPLICATED, UNSPECIFIED WHETHER PERSISTENT: ICD-10-CM

## 2022-03-10 DIAGNOSIS — R63.4 WEIGHT LOSS: ICD-10-CM

## 2022-03-10 DIAGNOSIS — K21.9 GASTROESOPHAGEAL REFLUX DISEASE WITHOUT ESOPHAGITIS: ICD-10-CM

## 2022-03-10 PROCEDURE — 36415 COLL VENOUS BLD VENIPUNCTURE: CPT | Performed by: INTERNAL MEDICINE

## 2022-03-11 LAB
ALBUMIN SERPL-MCNC: 4.4 G/DL (ref 3.7–4.7)
ALBUMIN/GLOB SERPL: 1.8 {RATIO} (ref 1.2–2.2)
ALP SERPL-CCNC: 73 IU/L (ref 44–121)
ALT SERPL-CCNC: 14 IU/L (ref 0–44)
AST SERPL-CCNC: 22 IU/L (ref 0–40)
BASOPHILS # BLD AUTO: 0.1 X10E3/UL (ref 0–0.2)
BASOPHILS NFR BLD AUTO: 1 %
BILIRUB SERPL-MCNC: 0.5 MG/DL (ref 0–1.2)
BUN SERPL-MCNC: 10 MG/DL (ref 8–27)
BUN/CREAT SERPL: 12 (ref 10–24)
CALCIUM SERPL-MCNC: 9.7 MG/DL (ref 8.6–10.2)
CHLORIDE SERPL-SCNC: 100 MMOL/L (ref 96–106)
CHOLEST SERPL-MCNC: 195 MG/DL (ref 100–199)
CO2 SERPL-SCNC: 24 MMOL/L (ref 20–29)
CREAT SERPL-MCNC: 0.81 MG/DL (ref 0.76–1.27)
EGFRCR SERPLBLD CKD-EPI 2021: 93 ML/MIN/1.73
EOSINOPHIL # BLD AUTO: 0.5 X10E3/UL (ref 0–0.4)
EOSINOPHIL NFR BLD AUTO: 6 %
ERYTHROCYTE [DISTWIDTH] IN BLOOD BY AUTOMATED COUNT: 12.6 % (ref 11.6–15.4)
GLOBULIN SER CALC-MCNC: 2.4 G/DL (ref 1.5–4.5)
GLUCOSE SERPL-MCNC: 64 MG/DL (ref 65–99)
HCT VFR BLD AUTO: 49.6 % (ref 37.5–51)
HDLC SERPL-MCNC: 49 MG/DL
HGB BLD-MCNC: 16.4 G/DL (ref 13–17.7)
IMM GRANULOCYTES # BLD AUTO: 0 X10E3/UL (ref 0–0.1)
IMM GRANULOCYTES NFR BLD AUTO: 0 %
LDLC SERPL CALC-MCNC: 123 MG/DL (ref 0–99)
LYMPHOCYTES # BLD AUTO: 2.5 X10E3/UL (ref 0.7–3.1)
LYMPHOCYTES NFR BLD AUTO: 28 %
MCH RBC QN AUTO: 29.8 PG (ref 26.6–33)
MCHC RBC AUTO-ENTMCNC: 33.1 G/DL (ref 31.5–35.7)
MCV RBC AUTO: 90 FL (ref 79–97)
MONOCYTES # BLD AUTO: 0.7 X10E3/UL (ref 0.1–0.9)
MONOCYTES NFR BLD AUTO: 7 %
NEUTROPHILS # BLD AUTO: 5.2 X10E3/UL (ref 1.4–7)
NEUTROPHILS NFR BLD AUTO: 58 %
PLATELET # BLD AUTO: 234 X10E3/UL (ref 150–450)
POTASSIUM SERPL-SCNC: 4.9 MMOL/L (ref 3.5–5.2)
PROT SERPL-MCNC: 6.8 G/DL (ref 6–8.5)
RBC # BLD AUTO: 5.51 X10E6/UL (ref 4.14–5.8)
SODIUM SERPL-SCNC: 141 MMOL/L (ref 134–144)
TRIGL SERPL-MCNC: 130 MG/DL (ref 0–149)
TSH SERPL DL<=0.005 MIU/L-ACNC: 1.8 UIU/ML (ref 0.45–4.5)
VLDLC SERPL CALC-MCNC: 23 MG/DL (ref 5–40)
WBC # BLD AUTO: 9 X10E3/UL (ref 3.4–10.8)

## 2022-05-24 ENCOUNTER — DOCUMENTATION (OUTPATIENT)
Dept: INTERNAL MEDICINE | Facility: CLINIC | Age: 77
End: 2022-05-24
Payer: COMMERCIAL

## 2022-05-24 NOTE — PROGRESS NOTES
Jemima Good MA has completed a chart review for Zaki Wilder and have determined that the care gap has been satisfied.    Care Gap Source: [employer]    Care Gap(s) Identified: Colorectal Cancer Screening    Chart Review Completed: Yes     Colonoscopy completed 10/23/2013.

## 2022-09-07 ENCOUNTER — TRANSCRIBE ORDERS (OUTPATIENT)
Dept: SCHEDULING | Age: 77
End: 2022-09-07

## 2022-09-07 ENCOUNTER — CLINICAL SUPPORT (OUTPATIENT)
Dept: INTERNAL MEDICINE | Facility: CLINIC | Age: 77
End: 2022-09-07
Payer: COMMERCIAL

## 2022-09-07 DIAGNOSIS — R91.8 OTHER NONSPECIFIC ABNORMAL FINDING OF LUNG FIELD: Primary | ICD-10-CM

## 2022-09-08 ENCOUNTER — HOSPITAL ENCOUNTER (OUTPATIENT)
Dept: RADIOLOGY | Age: 77
Discharge: HOME | End: 2022-09-08
Attending: INTERNAL MEDICINE
Payer: COMMERCIAL

## 2022-09-08 DIAGNOSIS — R91.8 OTHER NONSPECIFIC ABNORMAL FINDING OF LUNG FIELD: ICD-10-CM

## 2022-09-08 PROCEDURE — 71250 CT THORAX DX C-: CPT

## 2022-09-13 ENCOUNTER — OFFICE VISIT (OUTPATIENT)
Dept: INTERNAL MEDICINE | Facility: CLINIC | Age: 77
End: 2022-09-13
Payer: COMMERCIAL

## 2022-09-13 VITALS
SYSTOLIC BLOOD PRESSURE: 140 MMHG | OXYGEN SATURATION: 99 % | TEMPERATURE: 97.9 F | DIASTOLIC BLOOD PRESSURE: 80 MMHG | WEIGHT: 193.2 LBS | HEART RATE: 83 BPM | BODY MASS INDEX: 25.67 KG/M2 | RESPIRATION RATE: 16 BRPM

## 2022-09-13 DIAGNOSIS — M25.562 CHRONIC PAIN OF LEFT KNEE: ICD-10-CM

## 2022-09-13 DIAGNOSIS — J45.909 ASTHMA, UNSPECIFIED ASTHMA SEVERITY, UNSPECIFIED WHETHER COMPLICATED, UNSPECIFIED WHETHER PERSISTENT: Primary | ICD-10-CM

## 2022-09-13 DIAGNOSIS — L21.0 SEBORRHEA CAPITIS: ICD-10-CM

## 2022-09-13 DIAGNOSIS — G89.29 CHRONIC PAIN OF LEFT KNEE: ICD-10-CM

## 2022-09-13 PROCEDURE — 3008F BODY MASS INDEX DOCD: CPT | Performed by: INTERNAL MEDICINE

## 2022-09-13 PROCEDURE — 99214 OFFICE O/P EST MOD 30 MIN: CPT | Performed by: INTERNAL MEDICINE

## 2022-09-13 RX ORDER — KETOCONAZOLE 20 MG/G
CREAM TOPICAL
Qty: 60 G | Refills: 3 | Status: SHIPPED | OUTPATIENT
Start: 2022-09-13 | End: 2022-10-03

## 2022-09-13 NOTE — PROGRESS NOTES
Chief Complaint   Patient presents with    Other     6 month follow up       Subjective      Patient ID: Zaki Wilder is a 77 y.o. male.    HPI     Seborrheic capitis: Improved with ketoconazole.  Needs refill    Asthma: Well-controlled    Knee pain: Controlled          Objective     Vitals:    09/13/22 1315   BP: 140/80   BP Location: Left upper arm   Patient Position: Sitting   Pulse: 83   Resp: 16   Temp: 36.6 °C (97.9 °F)   TempSrc: Oral   SpO2: 99%   Weight: 87.6 kg (193 lb 3.2 oz)     Physical Exam  Vitals reviewed.   Constitutional:       General: He is not in acute distress.     Appearance: He is well-developed.   HENT:      Head: Normocephalic and atraumatic.   Eyes:      Conjunctiva/sclera: Conjunctivae normal.   Cardiovascular:      Rate and Rhythm: Normal rate and regular rhythm.   Pulmonary:      Effort: Pulmonary effort is normal.      Breath sounds: Normal breath sounds.   Abdominal:      General: There is no distension.      Palpations: Abdomen is soft.      Tenderness: There is no abdominal tenderness.   Musculoskeletal:         General: No swelling.   Neurological:      Mental Status: He is alert. Mental status is at baseline.   Psychiatric:         Mood and Affect: Mood normal.         Behavior: Behavior normal.         The following have been reviewed and updated as appropriate in this visit:   Allergies  Meds  Problems          Review of Systems   Constitutional: Negative for chills and fever.   Cardiovascular: Negative for chest pain.   Gastrointestinal: Negative for abdominal pain.         Current Outpatient Medications:     ADVAIR DISKUS 100-50 mcg/dose diskus inhaler, Inhale 1 puff 2 (two) times a day., Disp: 60 each, Rfl: 1    azelastine 137 mcg (0.1 %) nasal spray, Administer 1 spray into each nostril daily., Disp: 30 mL, Rfl: 11    clobetasol (TEMOVATE) 0.05 % cream, Apply to affected area as daily as needed, Disp: 45 g, Rfl: 1    ketoconazole (NIZORAL) 2 % cream, Use as directed  twice weekly topically, Disp: 60 g, Rfl: 3    tacrolimus (PROTOPIC) 0.1 % ointment, Apply 0.1 % topically daily., Disp: , Rfl:     albuterol HFA 90 mcg/actuation inhaler, Inhale 2 puffs every 6 (six) hours as needed for wheezing., Disp: 18 g, Rfl: 1    fluticasone propionate (FLONASE) 50 mcg/actuation nasal spray, Administer 2 sprays into each nostril daily as needed for rhinitis., Disp: , Rfl:     mometasone (ELOCON) 0.1 % cream, Apply 1 application topically daily., Disp: 45 g, Rfl: 3    Assessment/Plan   Asthma  Pulm tried to de-escalate therapy from Advair to Flovent but patient symptoms worse  · Continue advair, albuterol as needed  · Continue to follow with pulmonology    Seborrhea capitis  Continue ketoconazole     Chronic pain of left knee  Stable  X-ray did not show significant arthritis  · Monitor for worsening      No orders of the defined types were placed in this encounter.    New Medications Ordered This Visit   Medications    ketoconazole (NIZORAL) 2 % cream     Sig: Use as directed twice weekly topically     Dispense:  60 g     Refill:  3       Return in about 6 months (around 3/13/2023).     LATOYA GRANDA MD

## 2022-09-13 NOTE — PATIENT INSTRUCTIONS
Asthma  Continue advair    Seborrhea capitis  Continue ketoconazole     Return in about 6 months (around 3/13/2023).

## 2022-09-13 NOTE — ASSESSMENT & PLAN NOTE
BEHAVIORAL TEAM DISCUSSION    Participants: Lucille Sales, [facility], Jarett Pack MD, Brayden Henry, RN.   Progress: Negligible   Anticipated length of stay: Unknown  Continued Stay Criteria/Rationale: Patient remains acutely psychotic, is having difficulty with neuroleptic medications that she is either willing to take or doesn't cause side effects, and has a Mcgrath-Kohler Hearing coming up on 5/21/21 with [facility].   Medical/Physical: Hypertension, Oculogyric Emergency   Precautions:   Behavioral Orders   Procedures    Assault precautions    Cheeking Precautions (behavioral units)     Patient Observed swallowing PO medications; Patient asked to drink water after swallowing medication; Patient in Staff line of sight for 15 minutes after medication given; Mouth checks after PO administration (patient asked to open mouth and stick out their tongue).    Code 1 - Restrict to Unit    Elopement precautions    Routine Programming     As clinically indicated    Status 15     Every 15 minutes.     Plan: Provider will continue to try and stabilize patient through use of Neuroleptic medications, and ECT will be utilized if [facility] agrees with petition, and it becomes medically necessary.   Rationale for change in precautions or plan: Patient has displayed the need for ECT to become an option for treatment.          Pulm tried to de-escalate therapy from Advair to Flovent but patient symptoms worse  · Continue advair, albuterol as needed  · Continue to follow with pulmonology

## 2022-09-15 ASSESSMENT — ENCOUNTER SYMPTOMS
FEVER: 0
CHILLS: 0
ABDOMINAL PAIN: 0

## 2022-10-03 RX ORDER — KETOCONAZOLE 20 MG/ML
SHAMPOO, SUSPENSION TOPICAL 2 TIMES WEEKLY
Qty: 120 ML | Refills: 3 | Status: SHIPPED | OUTPATIENT
Start: 2022-10-03 | End: 2022-11-02

## 2022-10-03 NOTE — TELEPHONE ENCOUNTER
From: Zaki Wilder  To: Office of LATOYA GRANDA MD  Sent: 10/1/2022 9:25 AM EDT  Subject: Medication Renewal Request    Refills have been requested for the following medications:   Other - ketaconazole 2% shampoo    Preferred pharmacy: [id number] - [place] - [address]  Delivery method: Pickup

## 2023-01-07 ENCOUNTER — HOSPITAL ENCOUNTER (OUTPATIENT)
Facility: CLINIC | Age: 78
Discharge: HOME | End: 2023-01-07
Attending: FAMILY MEDICINE
Payer: COMMERCIAL

## 2023-01-07 VITALS
SYSTOLIC BLOOD PRESSURE: 156 MMHG | DIASTOLIC BLOOD PRESSURE: 82 MMHG | RESPIRATION RATE: 18 BRPM | HEART RATE: 76 BPM | TEMPERATURE: 97.8 F | OXYGEN SATURATION: 98 %

## 2023-01-07 DIAGNOSIS — R21 BLISTERING RASH: ICD-10-CM

## 2023-01-07 DIAGNOSIS — R21 RASH: Primary | ICD-10-CM

## 2023-01-07 PROBLEM — R63.4 WEIGHT LOSS: Status: RESOLVED | Noted: 2021-08-24 | Resolved: 2023-01-07

## 2023-01-07 PROCEDURE — 99213 OFFICE O/P EST LOW 20 MIN: CPT | Performed by: NURSE PRACTITIONER

## 2023-01-07 RX ORDER — CLOBETASOL PROPIONATE 0.5 MG/G
CREAM TOPICAL 2 TIMES DAILY
Qty: 60 G | Refills: 0 | Status: SHIPPED | OUTPATIENT
Start: 2023-01-07 | End: 2023-02-06

## 2023-01-07 RX ORDER — KETOCONAZOLE 20 MG/ML
SHAMPOO, SUSPENSION TOPICAL
COMMUNITY
Start: 2022-12-12 | End: 2023-12-04 | Stop reason: SDUPTHER

## 2023-01-07 RX ORDER — VALACYCLOVIR HYDROCHLORIDE 1 G/1
1000 TABLET, FILM COATED ORAL 3 TIMES DAILY
Qty: 21 TABLET | Refills: 0 | Status: SHIPPED | OUTPATIENT
Start: 2023-01-07 | End: 2023-03-14

## 2023-01-07 RX ORDER — MONTELUKAST SODIUM 10 MG/1
10 TABLET ORAL DAILY
COMMUNITY
Start: 2022-12-11

## 2023-01-07 NOTE — ED PROVIDER NOTES
[facility]  Urgent Care, Occupational Health, & Travel Medicine  Emergency Medicine/Urgent Care Note  Rachel Good, APRIL, CRNP, FNP-BC  Nurse Practitioner    HISTORY OF PRESENT ILLNESS     76 yo patient presenting today for evaluation of a rash.    Reports:  - Erythematous, itchy rash @inner left arm.  - Patient states rash initially onset about 3 weeks ago and manifested as an itchy, dermatitis rash.   - He states he had no exposure to poison ivy, poison oak, or any poisonous plants or allergens.  - He states this rash and its presentation seemed to have improved over the last 3 weeks, but yesterday started noticing blisters, vesicles, and worsening of the rash in that area.  - He denies any prodromal sensation or numbness/tingling, but states that the rash is burning and itchy.  - He had Zostavax in 2013 but no Shingrix vaccines.    Denies:  - No recent antibiotic use.  - No associated pain, numbness, tingling, or paresthesias.  - No drainage or discharge.   - No effect on joints or mobility.   - No bldg, bruising, ecchymosis, or evidence of friability.          PAST HISTORY     Reviewed from Nursing Triage:    Tobacco  Allergies  Meds  Problems  Med Hx  Surg Hx  Fam Hx  Soc   Hx      Past Medical History:   Diagnosis Date   • Mild intermittent asthma 5/2/2018       History reviewed. No pertinent surgical history.    History reviewed. No pertinent family history.    Social History     Tobacco Use   • Smoking status: Never   • Smokeless tobacco: Never   Substance Use Topics   • Alcohol use: Yes     Alcohol/week: 7.0 standard drinks     Types: 7 Standard drinks or equivalent per week     Comment: daily   • Drug use: No        REVIEW OF SYSTEMS     • Constitutional: No fever, chills, aches. No s/s of systemic infection.  • HENT: No URI symptoms.   • Neck/nodes: No abnormal findings.  • Respiratory: No cough, SOB, HERRERA, wheezing.   • Cardiovascular: No CP, palpitations, edema, activity  intolerance.  • Gastrointestinal: No abdominal pain, bowel pattern changes, n/v/d/c. No GERD at this time.  • Musculoskeletal: No body aches, joint aches. No rashes or skin changes specifically associated w/ joints.  • Skin: Rash noted at the medial aspect of the left arm.  See HPI for details.  • Neurological: No associated n/t or paresthesias. No extremity weakness, focal weakness, lightheadedness, vertigo. No headaches.  • Hematological: No bruising/bleeding.        VITALS     ED Vitals    Date/Time Temp Pulse Resp BP SpO2 [facility]   01/07/23 1019 36.6 °C (97.8 °F) 76 18 156/82 98 % DS             PHYSICAL EXAM   General  -NAD. Appears comfortable, relaxed. No acute pain.  -Appetite is baseline.   -No fever, chills, tremors.    HEENT/NODES/NECK  -Ears: External ear B/L WNL. No trauma. No obvious hearing deficits or complaints. No pain.   -Eyes: No complaints/concerns.  -Nose: No notable nasal or sinus congestion.  -Throat: No pain. No vocal hoarseness. No symptoms of angioedema.  -No new or unexplained bruising or bleeding of any kind.   -No lymphadenopathy as reported above.     Chest/Respiratory/CV/Perfusion  -No SOB/HERRERA. POX remains stable on RA. RR WNL. AVSS. No CP, palpitations, SOB, HERRERA.     MSK  -No abnormalities at joints. No erythema, warmth, or skin changes specifically associated with joints.    Skin/Integ  RASH NOTED.  -Pruritic, erythematous rash noted.   -Mostly papular - no bullae. Small vesicles noted. No warmth. Blanchable.   -There are some linear/streak-like configurations noted which follow a dermatome at the medial aspect of the left arm. Does not cross the midline.   -Negative for abscess formation, active drainage, discharge, or bleeding, streaking, or palpable lumps associated with the skin changes.    Neuro  -Sensation normal. Negative for numbness, tingling, paresthesias. No pain associated with the rash/skin changes.      PROCEDURES     Procedures     DATA     Results     None          No  orders to display       Scoring tools                                    MDM / ED COURSE / CLINICAL IMPRESSION / DISPO     Clinical Impression      None       MDM:    · See HPI, ROS, PE. Unclear etiology at this time, but concern for shingles based on appearance of the rash and subjective symptoms. Patient last had Zostavax in 2013 and per records, never had the Shingrix series.  · Recommended OTC Zyrtec 10 mg daily x 7 days. Instructed to take exactly as written on packaging. Reviewed possible side effect profile.   · Prescribed clobetasol cream for topical use of symptoms. Recommended BID use, but may use more frequently if beneficial. Max use will be 14 days. Patient is to follow-up sooner if symptoms are not improved.   · Reviewed red-flag symptoms and indications to present to the ER for further evaluation.   · Reviewed red-flag symptoms and indications to present to the ER (for worsening) or PCP (for lack of improvement, worsening, and general follow-up) for further evaluation.       Disposition  Discharge. Strict return precautions reviewed.              Rachel Good, APRIL  01/07/23 1050

## 2023-01-07 NOTE — DISCHARGE INSTRUCTIONS
Take the antiviral (Valtrex) as follows: 1 tablet 3 times daily for the next 7 days.  This will cover for shingles infection if present.  Based on the appearance of the rash, I do have suspicion/concern for possible shingles infection.  You may apply the clobetasol topical cream twice daily for the next 7 to 10 days.  Please make sure to follow-up with your PCP or dermatologist as soon as possible for any worsening despite the treatment.

## 2023-01-07 NOTE — ED ATTESTATION NOTE
The patient was evaluated and managed by the nurse practitioner.  I was present in the office and provided direct supervision.  I have reviewed and agree with the diagnosis and treatment plan.       Michelle Warren DO  01/07/23 1137

## 2023-03-14 ENCOUNTER — OFFICE VISIT (OUTPATIENT)
Dept: INTERNAL MEDICINE | Facility: CLINIC | Age: 78
End: 2023-03-14
Payer: COMMERCIAL

## 2023-03-14 VITALS
OXYGEN SATURATION: 98 % | SYSTOLIC BLOOD PRESSURE: 110 MMHG | HEART RATE: 97 BPM | DIASTOLIC BLOOD PRESSURE: 70 MMHG | BODY MASS INDEX: 25.51 KG/M2 | TEMPERATURE: 97 F | RESPIRATION RATE: 16 BRPM | WEIGHT: 192 LBS

## 2023-03-14 DIAGNOSIS — R21 RASH: Primary | ICD-10-CM

## 2023-03-14 DIAGNOSIS — K21.9 GASTROESOPHAGEAL REFLUX DISEASE WITHOUT ESOPHAGITIS: ICD-10-CM

## 2023-03-14 DIAGNOSIS — Z13.220 LIPID SCREENING: ICD-10-CM

## 2023-03-14 DIAGNOSIS — J45.909 ASTHMA, UNSPECIFIED ASTHMA SEVERITY, UNSPECIFIED WHETHER COMPLICATED, UNSPECIFIED WHETHER PERSISTENT: ICD-10-CM

## 2023-03-14 DIAGNOSIS — Z11.59 ENCOUNTER FOR HEPATITIS C SCREENING TEST FOR LOW RISK PATIENT: ICD-10-CM

## 2023-03-14 PROCEDURE — 3008F BODY MASS INDEX DOCD: CPT | Performed by: INTERNAL MEDICINE

## 2023-03-14 PROCEDURE — 99214 OFFICE O/P EST MOD 30 MIN: CPT | Performed by: INTERNAL MEDICINE

## 2023-03-14 RX ORDER — DOXYCYCLINE HYCLATE 100 MG
100 TABLET ORAL 2 TIMES DAILY
Qty: 14 TABLET | Refills: 0 | Status: SHIPPED | OUTPATIENT
Start: 2023-03-14 | End: 2023-03-21

## 2023-03-14 NOTE — PROGRESS NOTES
Chief Complaint   Patient presents with   • Other     6 month follow up       Subjective      Patient ID: Zaki Wilder is a 77 y.o. male.    HPI     Asthma: Stable.  Going to see pulmonology    Rash: On penis.  Applying tacrolimus.  Itching has improved    GERD: Stable      Objective     Vitals:    03/14/23 1310   BP: 110/70   BP Location: Left upper arm   Patient Position: Sitting   Pulse: 97   Resp: 16   Temp: 36.1 °C (97 °F)   TempSrc: Oral   SpO2: 98%   Weight: 87.1 kg (192 lb)       The following have been reviewed and updated as appropriate in this visit:   Allergies  Meds  Problems        Review of Systems   Constitutional: Negative for chills and fever.   Cardiovascular: Negative for chest pain.   Gastrointestinal: Negative for abdominal pain.         Current Outpatient Medications:   •  ADVAIR DISKUS 100-50 mcg/dose diskus inhaler, Inhale 1 puff 2 (two) times a day., Disp: 60 each, Rfl: 1  •  azelastine 137 mcg (0.1 %) nasal spray, Administer 1 spray into each nostril daily., Disp: 30 mL, Rfl: 11  •  doxycycline hyclate (VIBRA-TABS) 100 mg tablet, Take 1 tablet (100 mg total) by mouth 2 (two) times a day for 7 days., Disp: 14 tablet, Rfl: 0  •  ketoconazole (NIZORAL) 2 % shampoo, apply topically TWICE A WEEK (MON, THU). apply to DAMP SKIN, LATH...  (REFER TO PRESCRIPTION NOTES)., Disp: , Rfl:   •  montelukast (SINGULAIR) 10 mg tablet, Take 10 mg by mouth daily., Disp: , Rfl:   •  tacrolimus (PROTOPIC) 0.1 % ointment, Apply 0.1 % topically daily., Disp: , Rfl:   •  albuterol HFA 90 mcg/actuation inhaler, Inhale 2 puffs every 6 (six) hours as needed for wheezing., Disp: 18 g, Rfl: 1  •  clobetasoL (TEMOVATE) 0.05 % cream, Apply topically 2 (two) times a day., Disp: 60 g, Rfl: 0  •  fluticasone propionate (FLONASE) 50 mcg/actuation nasal spray, Administer 2 sprays into each nostril daily as needed for rhinitis., Disp: , Rfl:     Physical Exam  Vitals reviewed.   Constitutional:       General: He is not in  acute distress.     Appearance: He is well-developed.   HENT:      Head: Normocephalic and atraumatic.   Eyes:      Conjunctiva/sclera: Conjunctivae normal.   Cardiovascular:      Rate and Rhythm: Normal rate and regular rhythm.   Pulmonary:      Effort: Pulmonary effort is normal.      Breath sounds: Normal breath sounds.   Abdominal:      General: There is no distension.      Palpations: Abdomen is soft.      Tenderness: There is no abdominal tenderness.   Musculoskeletal:         General: No swelling.   Skin:     Comments: Erythematous rash on foreskin   Neurological:      Mental Status: He is alert. Mental status is at baseline.   Psychiatric:         Mood and Affect: Mood normal.         Behavior: Behavior normal.         Assessment/Plan   Rash  On penis   · Start ketoconazole cream twice a day for 14 days course  · Start doxycycline also.       Asthma  Continue advair and albuterol   continue to follow up with pulmonologist    Check labs today    Gastroesophageal reflux disease without esophagitis  Stable  · Continue Tums as needed      Orders Placed This Encounter   Procedures   • CBC and Differential   • Comprehensive metabolic panel   • Lipid panel   • TSH w reflex FT4   • Hepatitis C antibody     New Medications Ordered This Visit   Medications   • doxycycline hyclate (VIBRA-TABS) 100 mg tablet     Sig: Take 1 tablet (100 mg total) by mouth 2 (two) times a day for 7 days.     Dispense:  14 tablet     Refill:  0       Return in about 6 months (around 9/14/2023).     LATOYA GRANDA MD

## 2023-03-14 NOTE — PATIENT INSTRUCTIONS
Rash  Start ketoconazole cream twice a day for 14 days course  Start doxycycline also.       Asthma  Continue advair and albuterol   continue to follow up with pulmonologist      Return in about 6 months (around 9/14/2023).

## 2023-03-15 LAB
ALBUMIN SERPL-MCNC: 4.6 G/DL (ref 3.7–4.7)
ALBUMIN/GLOB SERPL: 1.9 {RATIO} (ref 1.2–2.2)
ALP SERPL-CCNC: 69 IU/L (ref 44–121)
ALT SERPL-CCNC: 16 IU/L (ref 0–44)
AST SERPL-CCNC: 21 IU/L (ref 0–40)
BASOPHILS # BLD AUTO: 0.1 X10E3/UL (ref 0–0.2)
BASOPHILS NFR BLD AUTO: 1 %
BILIRUB SERPL-MCNC: 0.6 MG/DL (ref 0–1.2)
BUN SERPL-MCNC: 12 MG/DL (ref 8–27)
BUN/CREAT SERPL: 14 (ref 10–24)
CALCIUM SERPL-MCNC: 9.7 MG/DL (ref 8.6–10.2)
CHLORIDE SERPL-SCNC: 105 MMOL/L (ref 96–106)
CHOLEST SERPL-MCNC: 199 MG/DL (ref 100–199)
CO2 SERPL-SCNC: 24 MMOL/L (ref 20–29)
CREAT SERPL-MCNC: 0.86 MG/DL (ref 0.76–1.27)
EGFRCR SERPLBLD CKD-EPI 2021: 90 ML/MIN/1.73
EOSINOPHIL # BLD AUTO: 0.6 X10E3/UL (ref 0–0.4)
EOSINOPHIL NFR BLD AUTO: 6 %
ERYTHROCYTE [DISTWIDTH] IN BLOOD BY AUTOMATED COUNT: 12.7 % (ref 11.6–15.4)
GLOBULIN SER CALC-MCNC: 2.4 G/DL (ref 1.5–4.5)
GLUCOSE SERPL-MCNC: 68 MG/DL (ref 70–99)
HCT VFR BLD AUTO: 47.4 % (ref 37.5–51)
HCV IGG SERPL QL IA: NON REACTIVE
HDLC SERPL-MCNC: 55 MG/DL
HGB BLD-MCNC: 16.6 G/DL (ref 13–17.7)
IMM GRANULOCYTES # BLD AUTO: 0 X10E3/UL (ref 0–0.1)
IMM GRANULOCYTES NFR BLD AUTO: 0 %
LDLC SERPL CALC-MCNC: 125 MG/DL (ref 0–99)
LYMPHOCYTES # BLD AUTO: 2.4 X10E3/UL (ref 0.7–3.1)
LYMPHOCYTES NFR BLD AUTO: 24 %
MCH RBC QN AUTO: 30.9 PG (ref 26.6–33)
MCHC RBC AUTO-ENTMCNC: 35 G/DL (ref 31.5–35.7)
MCV RBC AUTO: 88 FL (ref 79–97)
MONOCYTES # BLD AUTO: 0.8 X10E3/UL (ref 0.1–0.9)
MONOCYTES NFR BLD AUTO: 8 %
NEUTROPHILS # BLD AUTO: 6.4 X10E3/UL (ref 1.4–7)
NEUTROPHILS NFR BLD AUTO: 61 %
PLATELET # BLD AUTO: 233 X10E3/UL (ref 150–450)
POTASSIUM SERPL-SCNC: 4.9 MMOL/L (ref 3.5–5.2)
PROT SERPL-MCNC: 7 G/DL (ref 6–8.5)
RBC # BLD AUTO: 5.38 X10E6/UL (ref 4.14–5.8)
SODIUM SERPL-SCNC: 145 MMOL/L (ref 134–144)
TRIGL SERPL-MCNC: 105 MG/DL (ref 0–149)
TSH SERPL DL<=0.005 MIU/L-ACNC: 1.57 UIU/ML (ref 0.45–4.5)
VLDLC SERPL CALC-MCNC: 19 MG/DL (ref 5–40)
WBC # BLD AUTO: 10.3 X10E3/UL (ref 3.4–10.8)

## 2023-03-15 ASSESSMENT — ENCOUNTER SYMPTOMS
CHILLS: 0
ABDOMINAL PAIN: 0
FEVER: 0

## 2023-08-23 ENCOUNTER — TRANSCRIBE ORDERS (OUTPATIENT)
Dept: SCHEDULING | Age: 78
End: 2023-08-23

## 2023-08-23 DIAGNOSIS — R91.8 OTHER NONSPECIFIC ABNORMAL FINDING OF LUNG FIELD: Primary | ICD-10-CM

## 2023-09-05 ENCOUNTER — HOSPITAL ENCOUNTER (OUTPATIENT)
Dept: RADIOLOGY | Age: 78
Discharge: HOME | End: 2023-09-05
Attending: INTERNAL MEDICINE
Payer: COMMERCIAL

## 2023-09-05 DIAGNOSIS — R91.8 OTHER NONSPECIFIC ABNORMAL FINDING OF LUNG FIELD: ICD-10-CM

## 2023-09-05 PROCEDURE — 71250 CT THORAX DX C-: CPT

## 2023-09-18 ENCOUNTER — OFFICE VISIT (OUTPATIENT)
Dept: INTERNAL MEDICINE | Facility: CLINIC | Age: 78
End: 2023-09-18
Payer: COMMERCIAL

## 2023-09-18 VITALS
SYSTOLIC BLOOD PRESSURE: 135 MMHG | HEART RATE: 92 BPM | OXYGEN SATURATION: 97 % | WEIGHT: 196 LBS | DIASTOLIC BLOOD PRESSURE: 65 MMHG | BODY MASS INDEX: 26.04 KG/M2

## 2023-09-18 DIAGNOSIS — R25.1 TREMOR: ICD-10-CM

## 2023-09-18 DIAGNOSIS — R26.89 IMBALANCE: ICD-10-CM

## 2023-09-18 DIAGNOSIS — J45.909 ASTHMA, UNSPECIFIED ASTHMA SEVERITY, UNSPECIFIED WHETHER COMPLICATED, UNSPECIFIED WHETHER PERSISTENT: ICD-10-CM

## 2023-09-18 DIAGNOSIS — R91.1 PULMONARY NODULE: ICD-10-CM

## 2023-09-18 DIAGNOSIS — Z23 NEED FOR PROPHYLACTIC VACCINATION AND INOCULATION AGAINST INFLUENZA: ICD-10-CM

## 2023-09-18 PROCEDURE — 90694 VACC AIIV4 NO PRSRV 0.5ML IM: CPT | Performed by: INTERNAL MEDICINE

## 2023-09-18 PROCEDURE — 200200 PR NO CHARGE: Performed by: INTERNAL MEDICINE

## 2023-09-18 PROCEDURE — 99214 OFFICE O/P EST MOD 30 MIN: CPT | Mod: 25 | Performed by: INTERNAL MEDICINE

## 2023-09-18 PROCEDURE — G0008 ADMIN INFLUENZA VIRUS VAC: HCPCS | Performed by: INTERNAL MEDICINE

## 2023-09-18 PROCEDURE — 3008F BODY MASS INDEX DOCD: CPT | Performed by: INTERNAL MEDICINE

## 2023-09-18 ASSESSMENT — PATIENT HEALTH QUESTIONNAIRE - PHQ9: SUM OF ALL RESPONSES TO PHQ9 QUESTIONS 1 & 2: 0

## 2023-09-18 NOTE — ASSESSMENT & PLAN NOTE
Stable.  Has not worsened over the last 2 years  Possible Parkinson's versus parkinsonism  · Patient prefers monitoring symptoms for now which is reasonable

## 2023-09-18 NOTE — ASSESSMENT & PLAN NOTE
Off Advair due to hoarse voice  Doing well  He prefers not to restart Advair for now  · Use albuterol as needed only  · If using albuterol more than 2 times a week, restart advair

## 2023-09-18 NOTE — ASSESSMENT & PLAN NOTE
Borderline orthostasis today.  This improved slowly with prolonged standing  · Continuewhen changing positions home exercises  · Take your time

## 2023-09-18 NOTE — PATIENT INSTRUCTIONS
Asthma  Use albuterol as needed only  If using albuterol more than 2 times a week, restart advair    Tremor  Stable  monitor    Imbalance  Continue home exercises  Take your time when changing positions    Return in about 6 months (around 3/18/2024).

## 2023-09-18 NOTE — PROGRESS NOTES
Chief Complaint   Patient presents with    Follow-up       Subjective      Patient ID: Zaki Wilder is a 78 y.o. male.    HPI     Eczema: saw derm.  Given Opzelura.        Hoarse voice: Was advised to stop Advair by pulmonology.  Symptoms have now improved    Pulmonary nodules: No further imaging needed per pulmonology    Imbalance: Has history of labyrinth otitis.  States sometimes he loses his balance when he stands up.  No falls.  Doing exercises every day    Tremor: Mild in hands.  Has not worsened.    Asthma: Has been taking albuterol 2-3 times a day.  Patient thought this was a substitute for Advair.  He denies any shortness of breath or wheezing.  He prefers not to restart Advair    Rash: Penile rash improved with antifungal and steroids       Objective     Vitals:    09/18/23 0838 09/18/23 0902 09/18/23 0903   BP: (!) 142/90 (!) 145/80 135/65   BP Location: Left upper arm     Patient Position: Sitting Sitting Standing   Pulse: 92     SpO2: 97%     Weight: 88.9 kg (196 lb)         The following have been reviewed and updated as appropriate in this visit:   Allergies  Meds  Problems        Review of Systems   Constitutional: Negative for chills and fever.   Cardiovascular: Negative for chest pain.   Gastrointestinal: Negative for abdominal pain.         Current Outpatient Medications:     ADVAIR DISKUS 100-50 mcg/dose diskus inhaler, Inhale 1 puff 2 (two) times a day., Disp: 60 each, Rfl: 1    azelastine 137 mcg (0.1 %) nasal spray, Administer 1 spray into each nostril daily., Disp: 30 mL, Rfl: 11    ketoconazole (NIZORAL) 2 % shampoo, apply topically TWICE A WEEK (MON, THU). apply to DAMP SKIN, LATH...  (REFER TO PRESCRIPTION NOTES)., Disp: , Rfl:     montelukast (SINGULAIR) 10 mg tablet, Take 10 mg by mouth daily., Disp: , Rfl:     tacrolimus (PROTOPIC) 0.1 % ointment, Apply 0.1 % topically daily., Disp: , Rfl:     albuterol HFA 90 mcg/actuation inhaler, Inhale 2 puffs every 6 (six) hours as  needed for wheezing., Disp: 18 g, Rfl: 1    clobetasoL (TEMOVATE) 0.05 % cream, Apply topically 2 (two) times a day., Disp: 60 g, Rfl: 0    fluticasone propionate (FLONASE) 50 mcg/actuation nasal spray, Administer 2 sprays into each nostril daily as needed for rhinitis., Disp: , Rfl:     Physical Exam  Vitals reviewed.   Constitutional:       General: He is not in acute distress.     Appearance: He is well-developed.   HENT:      Head: Normocephalic and atraumatic.   Eyes:      Conjunctiva/sclera: Conjunctivae normal.   Cardiovascular:      Rate and Rhythm: Normal rate and regular rhythm.   Pulmonary:      Effort: Pulmonary effort is normal.      Breath sounds: Normal breath sounds.   Abdominal:      General: There is no distension.      Palpations: Abdomen is soft.      Tenderness: There is no abdominal tenderness.   Musculoskeletal:         General: No swelling.   Neurological:      Mental Status: He is alert. Mental status is at baseline.      Comments: Slight resting tremor bilateral hands   Psychiatric:         Mood and Affect: Mood normal.         Behavior: Behavior normal.         Assessment/Plan   Asthma  Off Advair due to hoarse voice  Doing well  He prefers not to restart Advair for now  · Use albuterol as needed only  · If using albuterol more than 2 times a week, restart advair    Tremor  Stable.  Has not worsened over the last 2 years  Possible Parkinson's versus parkinsonism  · Patient prefers monitoring symptoms for now which is reasonable      Imbalance  Borderline orthostasis today.  This improved slowly with prolonged standing  · Continuewhen changing positions home exercises  · Take your time     Pulmonary nodule  No further imaging needed per pulmonology as they have been stable over multiple scans      Orders Placed This Encounter   Procedures    Influenza vaccine Quad Adjuvanted 65 and Older (FluAd Quad)     No orders of the defined types were placed in this encounter.      Return in about 6  months (around 3/18/2024).     LATOYA GRANDA MD

## 2023-09-19 PROBLEM — R91.1 PULMONARY NODULE: Status: ACTIVE | Noted: 2023-09-19

## 2023-09-19 ASSESSMENT — ENCOUNTER SYMPTOMS
ABDOMINAL PAIN: 0
CHILLS: 0
FEVER: 0

## 2023-12-04 RX ORDER — KETOCONAZOLE 20 MG/ML
SHAMPOO, SUSPENSION TOPICAL
Qty: 120 ML | Refills: 1 | Status: SHIPPED | OUTPATIENT
Start: 2023-12-04 | End: 2024-07-08 | Stop reason: SDUPTHER

## 2023-12-04 RX ORDER — TACROLIMUS 1 MG/G
OINTMENT TOPICAL DAILY
Qty: 100 G | Refills: 0 | Status: SHIPPED | OUTPATIENT
Start: 2023-12-04 | End: 2024-08-11 | Stop reason: SDUPTHER

## 2023-12-04 NOTE — TELEPHONE ENCOUNTER
From: Zaki Wilder  To: Office of LATOYA GRANDA  Sent: 12/4/2023 9:57 AM EST  Subject: Medication Renewal Request    Refills have been requested for the following medications:   Other - ketoconazole 2% shampoo AND ketoconazole cream 2%     Preferred pharmacy: [id number] - [place]  [address]  Delivery method: Pickup      Medication renewals requested in this message routed separately:      tacrolimus (PROTOPIC) 0.1 % ointment

## 2024-01-12 RX ORDER — TRIAMCINOLONE ACETONIDE 1 MG/G
CREAM TOPICAL 2 TIMES DAILY PRN
Qty: 454 G | Refills: 0 | Status: SHIPPED | OUTPATIENT
Start: 2024-01-12 | End: 2024-08-11 | Stop reason: SDUPTHER

## 2024-01-12 NOTE — TELEPHONE ENCOUNTER
"From: Zaki Wilder  To: Office of LATOYA GRANDA  Sent: 1/10/2024 8:44 AM EST  Subject: Medication Renewal Request    Refills have been requested for the following medications:   Other - triamcinolone 0.1% 454 gr ..for my hands .. running out of my \"old\" bucket    Preferred pharmacy: [id number] - [place] - [address]  Delivery method: Pickup  "

## 2024-03-18 ENCOUNTER — OFFICE VISIT (OUTPATIENT)
Dept: INTERNAL MEDICINE | Facility: CLINIC | Age: 79
End: 2024-03-18
Payer: COMMERCIAL

## 2024-03-18 VITALS
TEMPERATURE: 97.8 F | RESPIRATION RATE: 16 BRPM | DIASTOLIC BLOOD PRESSURE: 70 MMHG | OXYGEN SATURATION: 97 % | HEART RATE: 84 BPM | BODY MASS INDEX: 26.28 KG/M2 | SYSTOLIC BLOOD PRESSURE: 130 MMHG | WEIGHT: 197.8 LBS

## 2024-03-18 DIAGNOSIS — R35.0 URINARY FREQUENCY: ICD-10-CM

## 2024-03-18 DIAGNOSIS — E78.5 HYPERLIPIDEMIA, UNSPECIFIED HYPERLIPIDEMIA TYPE: ICD-10-CM

## 2024-03-18 DIAGNOSIS — M47.816 LUMBAR SPONDYLOSIS: ICD-10-CM

## 2024-03-18 DIAGNOSIS — J45.909 ASTHMA, UNSPECIFIED ASTHMA SEVERITY, UNSPECIFIED WHETHER COMPLICATED, UNSPECIFIED WHETHER PERSISTENT: Primary | ICD-10-CM

## 2024-03-18 DIAGNOSIS — L30.9 DERMATITIS: ICD-10-CM

## 2024-03-18 DIAGNOSIS — Z00.00 HEALTHCARE MAINTENANCE: ICD-10-CM

## 2024-03-18 PROBLEM — L21.0 SEBORRHEA CAPITIS: Status: RESOLVED | Noted: 2022-09-13 | Resolved: 2024-03-18

## 2024-03-18 PROCEDURE — 3008F BODY MASS INDEX DOCD: CPT | Performed by: INTERNAL MEDICINE

## 2024-03-18 PROCEDURE — 36415 COLL VENOUS BLD VENIPUNCTURE: CPT | Performed by: INTERNAL MEDICINE

## 2024-03-18 PROCEDURE — 99214 OFFICE O/P EST MOD 30 MIN: CPT | Performed by: INTERNAL MEDICINE

## 2024-03-18 RX ORDER — RUXOLITINIB 15 MG/G
CREAM TOPICAL
COMMUNITY

## 2024-03-18 ASSESSMENT — ENCOUNTER SYMPTOMS
FEVER: 0
ABDOMINAL PAIN: 0
CHILLS: 0

## 2024-03-18 NOTE — PATIENT INSTRUCTIONS
Asthma  Okay to continue Advair once a day   Continue montelukast   Continue albuterol as needed    Healthcare maintenance  Can get RSV vaccine at the pharmacy    Return in about 6 months (around 9/18/2024).

## 2024-03-18 NOTE — PROGRESS NOTES
Chief Complaint   Patient presents with   • Other     6 month follow up       Subjective      Patient ID: Zaki Wilder is a 78 y.o. male.    HPI     Asthma: He restarted Advair once a day and symptoms are controlled    Dermatitis: Stable.  Taking topical medications  Sees dermatology once a year    Lumbar spondylosis: Pain controlled      Objective     Vitals:    03/18/24 0907   BP: 130/70   BP Location: Left upper arm   Patient Position: Sitting   Pulse: 84   Resp: 16   Temp: 36.6 °C (97.8 °F)   TempSrc: Oral   SpO2: 97%   Weight: 89.7 kg (197 lb 12.8 oz)       The following have been reviewed and updated as appropriate in this visit:   Allergies  Meds  Problems        Review of Systems   Constitutional: Negative for chills and fever.   Cardiovascular: Negative for chest pain.   Gastrointestinal: Negative for abdominal pain.         Current Outpatient Medications:   •  ADVAIR DISKUS 100-50 mcg/dose diskus inhaler, Inhale 1 puff 2 (two) times a day., Disp: 60 each, Rfl: 1  •  azelastine 137 mcg (0.1 %) nasal spray, Administer 1 spray into each nostril daily., Disp: 30 mL, Rfl: 11  •  ketoconazole (NIZORAL) 2 % shampoo, apply topically TWICE A WEEK (MON, THU). apply to DAMP SKIN, LATH...  (REFER TO PRESCRIPTION NOTES)., Disp: 120 mL, Rfl: 1  •  montelukast (SINGULAIR) 10 mg tablet, Take 10 mg by mouth daily., Disp: , Rfl:   •  ruxolitinib (OPZELURA) 1.5 % cream, Apply topically., Disp: , Rfl:   •  tacrolimus (PROTOPIC) 0.1 % ointment, Apply topically daily., Disp: 100 g, Rfl: 0  •  albuterol HFA 90 mcg/actuation inhaler, Inhale 2 puffs every 6 (six) hours as needed for wheezing., Disp: 18 g, Rfl: 1  •  clobetasoL (TEMOVATE) 0.05 % cream, Apply topically 2 (two) times a day., Disp: 60 g, Rfl: 0  •  fluticasone propionate (FLONASE) 50 mcg/actuation nasal spray, Administer 2 sprays into each nostril daily as needed for rhinitis., Disp: , Rfl:   •  triamcinolone (KENALOG) 0.1 % cream, Apply topically 2 (two) times a  day as needed for irritation., Disp: 454 g, Rfl: 0    Physical Exam  Vitals reviewed.   Constitutional:       General: He is not in acute distress.     Appearance: He is well-developed.   HENT:      Head: Normocephalic and atraumatic.   Eyes:      Conjunctiva/sclera: Conjunctivae normal.   Cardiovascular:      Rate and Rhythm: Normal rate and regular rhythm.   Pulmonary:      Effort: Pulmonary effort is normal.      Breath sounds: Normal breath sounds.   Abdominal:      General: There is no distension.      Palpations: Abdomen is soft.      Tenderness: There is no abdominal tenderness.   Musculoskeletal:         General: No swelling.   Neurological:      Mental Status: He is alert. Mental status is at baseline.   Psychiatric:         Mood and Affect: Mood normal.         Behavior: Behavior normal.         Assessment/Plan   Asthma  · Okay to continue Advair once a day   · Continue montelukast   · Continue albuterol as needed    Healthcare maintenance  Can get RSV vaccine at the pharmacy    Dermatitis  Currently on topical medications by dermatology  · Continue to follow with dermatology    Lumbar spondylosis  No significant pain currently      Orders Placed This Encounter   Procedures   • CBC and Differential   • Comprehensive metabolic panel   • Lipid panel   • TSH w reflex FT4   • PSA     New Medications Ordered This Visit   Medications   • ruxolitinib (OPZELURA) 1.5 % cream     Sig: Apply topically.       Return in about 6 months (around 9/18/2024).     LATOYA GRANDA MD

## 2024-03-19 LAB
ALBUMIN SERPL-MCNC: 4.4 G/DL (ref 3.8–4.8)
ALBUMIN/GLOB SERPL: 1.8 {RATIO} (ref 1.2–2.2)
ALP SERPL-CCNC: 70 IU/L (ref 44–121)
ALT SERPL-CCNC: 16 IU/L (ref 0–44)
AST SERPL-CCNC: 21 IU/L (ref 0–40)
BASOPHILS # BLD AUTO: 0.1 X10E3/UL (ref 0–0.2)
BASOPHILS NFR BLD AUTO: 1 %
BILIRUB SERPL-MCNC: 0.7 MG/DL (ref 0–1.2)
BUN SERPL-MCNC: 12 MG/DL (ref 8–27)
BUN/CREAT SERPL: 14 (ref 10–24)
CALCIUM SERPL-MCNC: 9.5 MG/DL (ref 8.6–10.2)
CHLORIDE SERPL-SCNC: 100 MMOL/L (ref 96–106)
CHOLEST SERPL-MCNC: 197 MG/DL (ref 100–199)
CO2 SERPL-SCNC: 24 MMOL/L (ref 20–29)
CREAT SERPL-MCNC: 0.87 MG/DL (ref 0.76–1.27)
EGFRCR SERPLBLD CKD-EPI 2021: 89 ML/MIN/1.73
EOSINOPHIL # BLD AUTO: 0.4 X10E3/UL (ref 0–0.4)
EOSINOPHIL NFR BLD AUTO: 5 %
ERYTHROCYTE [DISTWIDTH] IN BLOOD BY AUTOMATED COUNT: 12.7 % (ref 11.6–15.4)
GLOBULIN SER CALC-MCNC: 2.4 G/DL (ref 1.5–4.5)
GLUCOSE SERPL-MCNC: 73 MG/DL (ref 70–99)
HCT VFR BLD AUTO: 50.9 % (ref 37.5–51)
HDLC SERPL-MCNC: 50 MG/DL
HGB BLD-MCNC: 16.7 G/DL (ref 13–17.7)
IMM GRANULOCYTES # BLD AUTO: 0 X10E3/UL (ref 0–0.1)
IMM GRANULOCYTES NFR BLD AUTO: 1 %
LDLC SERPL CALC-MCNC: 126 MG/DL (ref 0–99)
LYMPHOCYTES # BLD AUTO: 1.7 X10E3/UL (ref 0.7–3.1)
LYMPHOCYTES NFR BLD AUTO: 20 %
MCH RBC QN AUTO: 30.4 PG (ref 26.6–33)
MCHC RBC AUTO-ENTMCNC: 32.8 G/DL (ref 31.5–35.7)
MCV RBC AUTO: 93 FL (ref 79–97)
MONOCYTES # BLD AUTO: 0.6 X10E3/UL (ref 0.1–0.9)
MONOCYTES NFR BLD AUTO: 8 %
NEUTROPHILS # BLD AUTO: 5.6 X10E3/UL (ref 1.4–7)
NEUTROPHILS NFR BLD AUTO: 65 %
PLATELET # BLD AUTO: 237 X10E3/UL (ref 150–450)
POTASSIUM SERPL-SCNC: 4.7 MMOL/L (ref 3.5–5.2)
PROT SERPL-MCNC: 6.8 G/DL (ref 6–8.5)
PSA SERPL-MCNC: 1.9 NG/ML (ref 0–4)
RBC # BLD AUTO: 5.5 X10E6/UL (ref 4.14–5.8)
SODIUM SERPL-SCNC: 139 MMOL/L (ref 134–144)
TRIGL SERPL-MCNC: 119 MG/DL (ref 0–149)
TSH SERPL DL<=0.005 MIU/L-ACNC: 1.65 UIU/ML (ref 0.45–4.5)
VLDLC SERPL CALC-MCNC: 21 MG/DL (ref 5–40)
WBC # BLD AUTO: 8.5 X10E3/UL (ref 3.4–10.8)

## 2024-03-19 RX ORDER — KETOCONAZOLE 20 MG/G
CREAM TOPICAL DAILY
Qty: 60 G | Refills: 1 | Status: SHIPPED | OUTPATIENT
Start: 2024-03-19 | End: 2024-04-18

## 2024-04-11 ENCOUNTER — APPOINTMENT (EMERGENCY)
Dept: RADIOLOGY | Facility: HOSPITAL | Age: 79
End: 2024-04-11
Attending: EMERGENCY MEDICINE
Payer: COMMERCIAL

## 2024-04-11 ENCOUNTER — HOSPITAL ENCOUNTER (EMERGENCY)
Facility: HOSPITAL | Age: 79
Discharge: HOME | End: 2024-04-11
Attending: EMERGENCY MEDICINE
Payer: COMMERCIAL

## 2024-04-11 ENCOUNTER — APPOINTMENT (EMERGENCY)
Dept: RADIOLOGY | Facility: HOSPITAL | Age: 79
End: 2024-04-11
Payer: COMMERCIAL

## 2024-04-11 VITALS
DIASTOLIC BLOOD PRESSURE: 72 MMHG | WEIGHT: 190 LBS | OXYGEN SATURATION: 98 % | RESPIRATION RATE: 18 BRPM | BODY MASS INDEX: 25.73 KG/M2 | SYSTOLIC BLOOD PRESSURE: 146 MMHG | HEART RATE: 96 BPM | TEMPERATURE: 98.1 F | HEIGHT: 72 IN

## 2024-04-11 DIAGNOSIS — J18.9 PNEUMONIA OF BOTH LOWER LOBES DUE TO INFECTIOUS ORGANISM: ICD-10-CM

## 2024-04-11 DIAGNOSIS — R06.02 SHORTNESS OF BREATH: Primary | ICD-10-CM

## 2024-04-11 DIAGNOSIS — R05.1 ACUTE COUGH: ICD-10-CM

## 2024-04-11 LAB
ALBUMIN SERPL-MCNC: 3.7 G/DL (ref 3.5–5.7)
ALP SERPL-CCNC: 73 IU/L (ref 34–125)
ALT SERPL-CCNC: 41 IU/L (ref 7–52)
ANION GAP SERPL CALC-SCNC: 7 MEQ/L (ref 3–15)
AST SERPL-CCNC: 32 IU/L (ref 13–39)
BASOPHILS # BLD: 0.13 K/UL (ref 0.01–0.1)
BASOPHILS NFR BLD: 1 %
BILIRUB SERPL-MCNC: 0.7 MG/DL (ref 0.3–1.2)
BNP SERPL-MCNC: 52 PG/ML
BUN SERPL-MCNC: 10 MG/DL (ref 7–25)
CALCIUM SERPL-MCNC: 8.8 MG/DL (ref 8.6–10.3)
CHLORIDE SERPL-SCNC: 96 MEQ/L (ref 98–107)
CO2 SERPL-SCNC: 27 MEQ/L (ref 21–31)
CREAT SERPL-MCNC: 0.7 MG/DL (ref 0.7–1.3)
DIFFERENTIAL METHOD BLD: ABNORMAL
EGFRCR SERPLBLD CKD-EPI 2021: >60 ML/MIN/1.73M*2
EOSINOPHIL # BLD: 0.31 K/UL (ref 0.04–0.54)
EOSINOPHIL NFR BLD: 2.4 %
ERYTHROCYTE [DISTWIDTH] IN BLOOD BY AUTOMATED COUNT: 12.4 % (ref 11.6–14.4)
GLUCOSE SERPL-MCNC: 98 MG/DL (ref 70–99)
HCT VFR BLD AUTO: 43.8 % (ref 40.1–51)
HGB BLD-MCNC: 15 G/DL (ref 13.7–17.5)
IMM GRANULOCYTES # BLD AUTO: 0.24 K/UL (ref 0–0.08)
IMM GRANULOCYTES NFR BLD AUTO: 1.8 %
LYMPHOCYTES # BLD: 2.15 K/UL (ref 1.2–3.5)
LYMPHOCYTES NFR BLD: 16.4 %
MCH RBC QN AUTO: 30.3 PG (ref 28–33.2)
MCHC RBC AUTO-ENTMCNC: 34.2 G/DL (ref 32.2–36.5)
MCV RBC AUTO: 88.5 FL (ref 83–98)
MONOCYTES # BLD: 1.72 K/UL (ref 0.3–1)
MONOCYTES NFR BLD: 13.1 %
NEUTROPHILS # BLD: 8.58 K/UL (ref 1.7–7)
NEUTS SEG NFR BLD: 65.3 %
NRBC BLD-RTO: 0 %
PDW BLD AUTO: 8.9 FL (ref 9.4–12.4)
PLATELET # BLD AUTO: 317 K/UL (ref 150–350)
POTASSIUM SERPL-SCNC: 4.3 MEQ/L (ref 3.5–5.1)
PROT SERPL-MCNC: 7.6 G/DL (ref 6–8.2)
RBC # BLD AUTO: 4.95 M/UL (ref 4.5–5.8)
SODIUM SERPL-SCNC: 130 MEQ/L (ref 136–145)
TROPONIN I SERPL HS-MCNC: 5.8 PG/ML
WBC # BLD AUTO: 13.13 K/UL (ref 3.8–10.5)

## 2024-04-11 PROCEDURE — 93005 ELECTROCARDIOGRAM TRACING: CPT

## 2024-04-11 PROCEDURE — 36415 COLL VENOUS BLD VENIPUNCTURE: CPT | Performed by: EMERGENCY MEDICINE

## 2024-04-11 PROCEDURE — 80053 COMPREHEN METABOLIC PANEL: CPT | Performed by: EMERGENCY MEDICINE

## 2024-04-11 PROCEDURE — 83880 ASSAY OF NATRIURETIC PEPTIDE: CPT

## 2024-04-11 PROCEDURE — 71046 X-RAY EXAM CHEST 2 VIEWS: CPT

## 2024-04-11 PROCEDURE — 85025 COMPLETE CBC W/AUTO DIFF WBC: CPT | Performed by: EMERGENCY MEDICINE

## 2024-04-11 PROCEDURE — 63700000 HC SELF-ADMINISTRABLE DRUG

## 2024-04-11 PROCEDURE — 63600105 HC IODINE BASED CONTRAST: Mod: JZ

## 2024-04-11 PROCEDURE — 84484 ASSAY OF TROPONIN QUANT: CPT | Performed by: EMERGENCY MEDICINE

## 2024-04-11 PROCEDURE — 99284 EMERGENCY DEPT VISIT MOD MDM: CPT | Mod: 25

## 2024-04-11 PROCEDURE — 71275 CT ANGIOGRAPHY CHEST: CPT

## 2024-04-11 PROCEDURE — 93005 ELECTROCARDIOGRAM TRACING: CPT | Performed by: EMERGENCY MEDICINE

## 2024-04-11 RX ORDER — AZITHROMYCIN 250 MG/1
500 TABLET, FILM COATED ORAL ONCE
Status: COMPLETED | OUTPATIENT
Start: 2024-04-11 | End: 2024-04-11

## 2024-04-11 RX ORDER — AZITHROMYCIN 250 MG/1
250 TABLET, FILM COATED ORAL DAILY
Qty: 4 TABLET | Refills: 0 | Status: SHIPPED | OUTPATIENT
Start: 2024-04-12 | End: 2024-04-16 | Stop reason: SDUPTHER

## 2024-04-11 RX ORDER — IOPAMIDOL 755 MG/ML
100 INJECTION, SOLUTION INTRAVASCULAR
Status: COMPLETED | OUTPATIENT
Start: 2024-04-11 | End: 2024-04-11

## 2024-04-11 RX ORDER — BENZONATATE 100 MG/1
200 CAPSULE ORAL ONCE
Status: COMPLETED | OUTPATIENT
Start: 2024-04-11 | End: 2024-04-11

## 2024-04-11 RX ADMIN — AZITHROMYCIN DIHYDRATE 500 MG: 250 TABLET ORAL at 18:37

## 2024-04-11 RX ADMIN — BENZONATATE 200 MG: 100 CAPSULE ORAL at 18:37

## 2024-04-11 RX ADMIN — IOPAMIDOL 100 ML: 755 INJECTION, SOLUTION INTRAVENOUS at 17:09

## 2024-04-11 NOTE — ED PROVIDER NOTES
HPI    Chief Complaint   Patient presents with    Chest Pain    Shortness of Breath        HPI   Patient is 78-year-old male with past medical history of asthma, eczema, lumbar spondylosis presenting for evaluation of shortness of breath and continued cough.  Patient was ill with upper respiratory infection 2 weeks ago with fevers and productive cough.  He notes the fevers have gone away and his cough is improving but continues.  He is scheduled to see Dr. Hooks from pulmonology tomorrow however several times throughout the day today he became so short of breath that he almost called 911 which prompted him to come to the emergency department.  The patient has not recently been on antibiotics or steroids.  He denies any hospitalizations or intubation for asthma.  He denies any personal or family history of DVT/PE, denies any recent prolonged immobilization or surgery.  He currently denies chest pain, denies history of heart attack or stent placement.  He denies headache, fever, chills, nausea or vomiting, diarrhea, constipation, leg edema.      Past Medical History:   Diagnosis Date    Eczema     Mild intermittent asthma 05/02/2018         History reviewed. No pertinent surgical history.    History reviewed. No pertinent family history.    Social History     Tobacco Use    Smoking status: Never    Smokeless tobacco: Never   Substance Use Topics    Alcohol use: Not Currently     Alcohol/week: 7.0 standard drinks of alcohol     Types: 7 Standard drinks or equivalent per week    Drug use: No       Systems Reviewed from Nursing Triage:   Tobacco  Allergies  Meds  Problems  Med Hx  Surg Hx  Fam Hx  Soc   Hx             Review of Systems         ED Triage Vitals [04/11/24 1522]   Temp Heart Rate Resp BP SpO2   36.7 °C (98.1 °F) (!) 106 19 (!) 188/88 96 %      Temp Source Heart Rate Source Patient Position BP Location FiO2 (%) (Set)   Oral -- -- Left upper arm --       Vitals:    04/11/24 1522 04/11/24 1830   BP: (!)  188/88 (!) 146/72   BP Location: Left upper arm    Pulse: (!) 106 96   Resp: 19 18   Temp: 36.7 °C (98.1 °F)    TempSrc: Oral    SpO2: 96% 98%   Weight: 86.2 kg (190 lb)    Height: 1.829 m (6')                          Physical Exam  Vitals and nursing note reviewed.   Constitutional:       Appearance: He is well-developed.   HENT:      Head: Normocephalic and atraumatic.   Eyes:      Conjunctiva/sclera: Conjunctivae normal.   Cardiovascular:      Rate and Rhythm: Regular rhythm. Tachycardia present.      Heart sounds: Normal heart sounds.   Pulmonary:      Effort: Pulmonary effort is normal.      Breath sounds: Rhonchi present.   Abdominal:      General: There is no distension.      Palpations: Abdomen is soft. There is no mass.      Tenderness: There is no abdominal tenderness.   Musculoskeletal:         General: No tenderness or deformity. Normal range of motion.      Cervical back: Normal range of motion.      Right lower le+ Pitting Edema present.      Left lower le+ Pitting Edema present.   Skin:     General: Skin is warm and dry.      Capillary Refill: Capillary refill takes less than 2 seconds.   Neurological:      General: No focal deficit present.      Mental Status: He is alert and oriented to person, place, and time. Mental status is at baseline.   Psychiatric:         Mood and Affect: Mood normal.         Behavior: Behavior normal.              CT ANGIOGRAPHY CHEST PULMONARY EMBOLISM WITH IV CONTRAST   Final Result   IMPRESSION: No evidence of acute pulmonary embolic disease. Patchy peripheral   bilateral lower lobe parenchymal densities may be inflammatory or infectious in   nature.      COMMENT: CT angiography of the chest was performed at 2.5 mm intervals during   dynamic intravenous contrast administration using the pulmonary was in protocol.   A total of 100 cc of Isovue-370 contrast material was administered   intravenously. 1.25 mm axial reconstructed images were obtained with reformatted    sagittal and coronal imaging.      3D MIP and/or volume rendered image reconstruction performed and reviewed.      No intraluminal filling defects are demonstrated within the pulmonary arteries   to indicate acute pulmonary embolic disease.      There is no evidence of thoracic aortic aneurysm or dissection.      No hilar or mediastinal adenopathy is demonstrated.      There is no pericardial effusion.      There are no occlusive endobronchial lesions.      There are patchy groundglass and semisolid pulmonary densities throughout both   lower lobes. These are seen within the superior segment of the left lower lobe   as well as within the posterobasilar segment where a 12 mm diameter   consolidating density is noted. Additional patchy groundglass infiltration is   seen peripherally within the right lower lobe including an 8mm diameter   infiltrative density at axial image #176.      There are no significant atelectatic changes. No pleural effusions are present.      Examination of the included portions of the upper abdomen reveals no upper   abdominal masses or fluid collections.      Osseous structures are intact.      CT DOSE:  One or more dose reduction techniques (e.g. automated exposure   control, adjustment of the mA and/or kV according to patient size, use of   iterative reconstruction technique) utilized for this examination.               ECG 12 lead   ED Interpretation   Rhythm: Normal Sinus  Rate: 100  P waves: normal  QRS: normal  Axis: normal  ST Segments: no obvious ST elevation or ischemia    Rhythm Strip: NSR    O2 sat: normal        X-RAY CHEST 2 VIEWS   Final Result   IMPRESSION:   No active disease.          Labs Reviewed   CBC AND DIFF - Abnormal       Result Value    WBC 13.13 (*)     RBC 4.95      Hemoglobin 15.0      Hematocrit 43.8      MCV 88.5      MCH 30.3      MCHC 34.2      RDW 12.4      Platelets 317      MPV 8.9 (*)     Differential Type Auto      nRBC 0.0      Immature Granulocytes  1.8      Neutrophils 65.3      Lymphocytes 16.4      Monocytes 13.1      Eosinophils 2.4      Basophils 1.0      Immature Granulocytes, Absolute 0.24 (*)     Neutrophils, Absolute 8.58 (*)     Lymphocytes, Absolute 2.15      Monocytes, Absolute 1.72 (*)     Eosinophils, Absolute 0.31      Basophils, Absolute 0.13 (*)    COMPREHENSIVE METABOLIC PANEL - Abnormal    Sodium 130 (*)     Potassium 4.3      Chloride 96 (*)     CO2 27      BUN 10      Creatinine 0.7      Glucose 98      Calcium 8.8      AST (SGOT) 32      ALT (SGPT) 41      Alkaline Phosphatase 73      Total Protein 7.6      Albumin 3.7      Bilirubin, Total 0.7      eGFR >60.0      Anion Gap 7     HIGH SENSITIVE TROPONIN I (BASELINE - REFLEX 2HR) - Normal    High Sens Troponin I 5.8     B-TYPE NATRIURETIC PEPTIDE - Normal    BNP 52     RAINBOW DRAW PANEL    Narrative:     The following orders were created for panel order [place] Draw Panel.  Procedure                               Abnormality         Status                     ---------                               -----------         ------                     RAINBOW RED[[id number]]                                      In process                 RAINBOW LT BLUE[[id number]]                                  In process                 RAINBOW GOLD[[id number]]                                     In process                   Please view results for these tests on the individual orders.   RAINBOW RED   RAINBOW LT BLUE   RAINBOW GOLD       CT ANGIOGRAPHY CHEST PULMONARY EMBOLISM WITH IV CONTRAST   Final Result   IMPRESSION: No evidence of acute pulmonary embolic disease. Patchy peripheral   bilateral lower lobe parenchymal densities may be inflammatory or infectious in   nature.      COMMENT: CT angiography of the chest was performed at 2.5 mm intervals during   dynamic intravenous contrast administration using the pulmonary was in protocol.   A total of 100 cc of Isovue-370 contrast material was administered    intravenously. 1.25 mm axial reconstructed images were obtained with reformatted   sagittal and coronal imaging.      3D MIP and/or volume rendered image reconstruction performed and reviewed.      No intraluminal filling defects are demonstrated within the pulmonary arteries   to indicate acute pulmonary embolic disease.      There is no evidence of thoracic aortic aneurysm or dissection.      No hilar or mediastinal adenopathy is demonstrated.      There is no pericardial effusion.      There are no occlusive endobronchial lesions.      There are patchy groundglass and semisolid pulmonary densities throughout both   lower lobes. These are seen within the superior segment of the left lower lobe   as well as within the posterobasilar segment where a 12 mm diameter   consolidating density is noted. Additional patchy groundglass infiltration is   seen peripherally within the right lower lobe including an 8mm diameter   infiltrative density at axial image #176.      There are no significant atelectatic changes. No pleural effusions are present.      Examination of the included portions of the upper abdomen reveals no upper   abdominal masses or fluid collections.      Osseous structures are intact.      CT DOSE:  One or more dose reduction techniques (e.g. automated exposure   control, adjustment of the mA and/or kV according to patient size, use of   iterative reconstruction technique) utilized for this examination.               ECG 12 lead   ED Interpretation   Rhythm: Normal Sinus  Rate: 100  P waves: normal  QRS: normal  Axis: normal  ST Segments: no obvious ST elevation or ischemia    Rhythm Strip: NSR    O2 sat: normal        X-RAY CHEST 2 VIEWS   Final Result   IMPRESSION:   No active disease.            Procedures    Final diagnoses:   [R06.02] Shortness of breath   [J18.9] Pneumonia of both lower lobes due to infectious organism   [R05.1] Acute cough       ED Course & MDM     ED Course as of 04/12/24 0047    Thu Apr 11, 2024   1612 X-RAY CHEST 2 VIEWS  IMPRESSION:  No active disease.   [SW]   1612 WBC(!): 13.13 [SW]   1622 Sodium(!): 130 [SW]   1622 Will proceed with CT of the chest, PE study considering recent viral illness and tachycardia.  Will also evaluate for pneumonia as I am suspicious considering the patient's hyponatremia and leukocytosis as well as his symptoms [SW]   1800 IMPRESSION: No evidence of acute pulmonary embolic disease. Patchy peripheral  bilateral lower lobe parenchymal densities may be inflammatory or infectious in  nature.   [SW]   1823 Considering patient's symptoms and leukocytosis, will treat with azithromycin.  Patient will see Dr. Hooks in the office as scheduled tomorrow.  We discussed his findings, we also discussed use of his albuterol inhaler and cough medication.  The patient is understanding and agreeable with the outpatient plan and is ambulating with a steady gait at discharge.  He is well-appearing and oxygenating well on room air. [SW]      ED Course User Index  [SW] Silvia Haines CRNP           Medical Decision Making  Problems Addressed:  Acute cough: acute illness or injury  Pneumonia of both lower lobes due to infectious organism: acute illness or injury  Shortness of breath: acute illness or injury    Amount and/or Complexity of Data Reviewed  External Data Reviewed: labs, radiology, ECG and notes.  Labs: ordered. Decision-making details documented in ED Course.  Radiology: ordered. Decision-making details documented in ED Course.  ECG/medicine tests: ordered and independent interpretation performed.    Risk  Prescription drug management.        12:47 AM      Impression/Medical Decision Making: Persistent cough and shortness of breath after URI    Plan: Labs, CXR, EKG, observe    Vital Signs Review: Vital signs have been reviewed. The oxygen saturation is  SpO2: 96 % which is normal.      JOSE ENRIQUE Coley  4/12/2024      We are in a period of time with increased volumes  and decreased capacity.     This document was created using dragon dictation software.  There might be some typographical errors due to this technology.       Silvia Haines CRNP  04/12/24 0047

## 2024-04-11 NOTE — ED ATTESTATION NOTE
IThe patient was evaluated and managed by the physician assistant / nurse practitioner. I agree.  I discussed the case with the NP who saw and evaluated the patient.  Patient's vital signs are reviewed.  Patient test results reviewed.  No evidence of PE.  Bilateral infectious or inflammatory process in lungs.  Plan antibiotic prescription.  Close follow-up with pulmonologist.    Labs Reviewed   CBC AND DIFF - Abnormal       Result Value    WBC 13.13 (*)     RBC 4.95      Hemoglobin 15.0      Hematocrit 43.8      MCV 88.5      MCH 30.3      MCHC 34.2      RDW 12.4      Platelets 317      MPV 8.9 (*)     Differential Type Auto      nRBC 0.0      Immature Granulocytes 1.8      Neutrophils 65.3      Lymphocytes 16.4      Monocytes 13.1      Eosinophils 2.4      Basophils 1.0      Immature Granulocytes, Absolute 0.24 (*)     Neutrophils, Absolute 8.58 (*)     Lymphocytes, Absolute 2.15      Monocytes, Absolute 1.72 (*)     Eosinophils, Absolute 0.31      Basophils, Absolute 0.13 (*)    COMPREHENSIVE METABOLIC PANEL - Abnormal    Sodium 130 (*)     Potassium 4.3      Chloride 96 (*)     CO2 27      BUN 10      Creatinine 0.7      Glucose 98      Calcium 8.8      AST (SGOT) 32      ALT (SGPT) 41      Alkaline Phosphatase 73      Total Protein 7.6      Albumin 3.7      Bilirubin, Total 0.7      eGFR >60.0      Anion Gap 7     HIGH SENSITIVE TROPONIN I (BASELINE - REFLEX 2HR) - Normal    High Sens Troponin I 5.8     B-TYPE NATRIURETIC PEPTIDE - Normal    BNP 52     RAINBOW DRAW PANEL    Narrative:     The following orders were created for panel order [place] Draw Panel.  Procedure                               Abnormality         Status                     ---------                               -----------         ------                     RAINBOW RED[[id number]]                                      In process                 RAINBOW LT BLUE[[id number]]                                  In process                 RAINBOW  GOLD[[id number]]                                     In process                   Please view results for these tests on the individual orders.   RAINBOW RED   RAINBOW LT BLUE   RAINBOW GOLD     CT ANGIOGRAPHY CHEST PULMONARY EMBOLISM WITH IV CONTRAST   Final Result   IMPRESSION: No evidence of acute pulmonary embolic disease. Patchy peripheral   bilateral lower lobe parenchymal densities may be inflammatory or infectious in   nature.      COMMENT: CT angiography of the chest was performed at 2.5 mm intervals during   dynamic intravenous contrast administration using the pulmonary was in protocol.   A total of 100 cc of Isovue-370 contrast material was administered   intravenously. 1.25 mm axial reconstructed images were obtained with reformatted   sagittal and coronal imaging.      3D MIP and/or volume rendered image reconstruction performed and reviewed.      No intraluminal filling defects are demonstrated within the pulmonary arteries   to indicate acute pulmonary embolic disease.      There is no evidence of thoracic aortic aneurysm or dissection.      No hilar or mediastinal adenopathy is demonstrated.      There is no pericardial effusion.      There are no occlusive endobronchial lesions.      There are patchy groundglass and semisolid pulmonary densities throughout both   lower lobes. These are seen within the superior segment of the left lower lobe   as well as within the posterobasilar segment where a 12 mm diameter   consolidating density is noted. Additional patchy groundglass infiltration is   seen peripherally within the right lower lobe including an 8mm diameter   infiltrative density at axial image #176.      There are no significant atelectatic changes. No pleural effusions are present.      Examination of the included portions of the upper abdomen reveals no upper   abdominal masses or fluid collections.      Osseous structures are intact.      CT DOSE:  One or more dose reduction techniques (e.g.  automated exposure   control, adjustment of the mA and/or kV according to patient size, use of   iterative reconstruction technique) utilized for this examination.               ECG 12 lead   ED Interpretation   Rhythm: Normal Sinus  Rate: 100  P waves: normal  QRS: normal  Axis: normal  ST Segments: no obvious ST elevation or ischemia    Rhythm Strip: NSR    O2 sat: normal        X-RAY CHEST 2 VIEWS   Final Result   IMPRESSION:   No active disease.             Mu Mo MD  04/12/24 0023

## 2024-04-12 ENCOUNTER — TELEPHONE (OUTPATIENT)
Dept: INTERNAL MEDICINE | Facility: CLINIC | Age: 79
End: 2024-04-12
Payer: COMMERCIAL

## 2024-04-12 LAB
ATRIAL RATE: 100
P AXIS: 63
PR INTERVAL: 132
QRS DURATION: 90
QT INTERVAL: 360
QTC CALCULATION(BAZETT): 464
R AXIS: 46
T WAVE AXIS: 52
VENTRICULAR RATE: 100

## 2024-04-12 PROCEDURE — 93010 ELECTROCARDIOGRAM REPORT: CPT | Performed by: INTERNAL MEDICINE

## 2024-04-16 ENCOUNTER — OFFICE VISIT (OUTPATIENT)
Dept: INTERNAL MEDICINE | Facility: CLINIC | Age: 79
End: 2024-04-16
Payer: COMMERCIAL

## 2024-04-16 VITALS
TEMPERATURE: 98.6 F | OXYGEN SATURATION: 99 % | DIASTOLIC BLOOD PRESSURE: 76 MMHG | HEART RATE: 72 BPM | RESPIRATION RATE: 16 BRPM | BODY MASS INDEX: 26.42 KG/M2 | SYSTOLIC BLOOD PRESSURE: 144 MMHG | WEIGHT: 194.8 LBS

## 2024-04-16 DIAGNOSIS — J45.909 ASTHMA, UNSPECIFIED ASTHMA SEVERITY, UNSPECIFIED WHETHER COMPLICATED, UNSPECIFIED WHETHER PERSISTENT: Primary | ICD-10-CM

## 2024-04-16 PROCEDURE — 99214 OFFICE O/P EST MOD 30 MIN: CPT | Performed by: INTERNAL MEDICINE

## 2024-04-16 PROCEDURE — 3008F BODY MASS INDEX DOCD: CPT | Performed by: INTERNAL MEDICINE

## 2024-04-16 RX ORDER — PREDNISONE 10 MG/1
40 TABLET ORAL DAILY
Qty: 20 TABLET | Refills: 0 | Status: SHIPPED | OUTPATIENT
Start: 2024-04-16 | End: 2024-09-23

## 2024-04-16 ASSESSMENT — ENCOUNTER SYMPTOMS
ABDOMINAL PAIN: 0
CHILLS: 0
FEVER: 0

## 2024-04-16 NOTE — PROGRESS NOTES
Chief Complaint   Patient presents with    Other     Asthma attacks x 1 week , cough and wheezing through the night        Subjective      Patient ID: Zaki Wilder is a 78 y.o. male.    HPI    Asthma: recently went to ER for SOB. Noted to have possible PNA. Given Z pack. Saw pulm. Advised to continue advair twice a day.   Since then cough has improved. Fever resolved.   Having intermittent asthma exacerbations at night.   Taking albuterol.       Objective     Vitals:    04/16/24 1628   BP: (!) 144/76   BP Location: Left upper arm   Patient Position: Sitting   Pulse: 72   Resp: 16   Temp: 37 °C (98.6 °F)   TempSrc: Oral   SpO2: 99%   Weight: 88.4 kg (194 lb 12.8 oz)       The following have been reviewed and updated as appropriate in this visit:   Allergies  Meds  Problems        Review of Systems   Constitutional:  Negative for chills and fever.   Cardiovascular:  Negative for chest pain.   Gastrointestinal:  Negative for abdominal pain.         Current Outpatient Medications:     ADVAIR DISKUS 100-50 mcg/dose diskus inhaler, Inhale 1 puff 2 (two) times a day., Disp: 60 each, Rfl: 1    azelastine 137 mcg (0.1 %) nasal spray, Administer 1 spray into each nostril daily., Disp: 30 mL, Rfl: 11    ketoconazole (NIZORAL) 2 % cream, Apply topically daily., Disp: 60 g, Rfl: 1    ketoconazole (NIZORAL) 2 % shampoo, apply topically TWICE A WEEK (MON, THU). apply to DAMP SKIN, LATH...  (REFER TO PRESCRIPTION NOTES)., Disp: 120 mL, Rfl: 1    montelukast (SINGULAIR) 10 mg tablet, Take 10 mg by mouth daily., Disp: , Rfl:     predniSONE (DELTASONE) 10 mg tablet, Take 4 tablets (40 mg total) by mouth daily for 5 days., Disp: 20 tablet, Rfl: 0    ruxolitinib (OPZELURA) 1.5 % cream, Apply topically., Disp: , Rfl:     tacrolimus (PROTOPIC) 0.1 % ointment, Apply topically daily., Disp: 100 g, Rfl: 0    albuterol HFA 90 mcg/actuation inhaler, Inhale 2 puffs every 6 (six) hours as needed for wheezing., Disp: 18 g, Rfl: 1     clobetasoL (TEMOVATE) 0.05 % cream, Apply topically 2 (two) times a day., Disp: 60 g, Rfl: 0    fluticasone propionate (FLONASE) 50 mcg/actuation nasal spray, Administer 2 sprays into each nostril daily as needed for rhinitis., Disp: , Rfl:     triamcinolone (KENALOG) 0.1 % cream, Apply topically 2 (two) times a day as needed for irritation., Disp: 454 g, Rfl: 0    Physical Exam  Vitals reviewed.   Constitutional:       General: He is not in acute distress.     Appearance: He is well-developed.   HENT:      Head: Normocephalic and atraumatic.   Eyes:      Conjunctiva/sclera: Conjunctivae normal.   Cardiovascular:      Rate and Rhythm: Normal rate and regular rhythm.   Pulmonary:      Effort: Pulmonary effort is normal. No respiratory distress.      Breath sounds: Normal breath sounds. No wheezing or rales.   Abdominal:      General: There is no distension.      Palpations: Abdomen is soft.      Tenderness: There is no abdominal tenderness. There is no guarding.   Musculoskeletal:         General: No swelling.   Neurological:      Mental Status: He is alert. Mental status is at baseline.   Psychiatric:         Mood and Affect: Mood normal.         Behavior: Behavior normal.         Assessment/Plan   Asthma  Likely due to recent respiratory infection.   S/p zpack. Cough and fever resolved  Continue advair twice   Continue albuterol as needed  Take albuterol 2 puff at night prior to sleep for 1 week  Start prednisone for 5 days      No orders of the defined types were placed in this encounter.    New Medications Ordered This Visit   Medications    predniSONE (DELTASONE) 10 mg tablet     Sig: Take 4 tablets (40 mg total) by mouth daily for 5 days.     Dispense:  20 tablet     Refill:  0       Return if symptoms worsen or fail to improve.     LATOYA GRANDA MD

## 2024-04-16 NOTE — PATIENT INSTRUCTIONS
Asthma  Continue advair twice   Continue albuterol as needed  Take albuterol 2 puff at night prior to sleep for 1 week  Start prednisone     Return if symptoms worsen or fail to improve.

## 2024-04-16 NOTE — ASSESSMENT & PLAN NOTE
Likely due to recent respiratory infection.   S/p zpack. Cough and fever resolved  Continue advair twice   Continue albuterol as needed  Take albuterol 2 puff at night prior to sleep for 1 week  Start prednisone for 5 days

## 2024-05-16 NOTE — ASSESSMENT & PLAN NOTE
Continue advair and albuterol   continue to follow up with pulmonologist    Check labs today  
On penis   · Start ketoconazole cream twice a day for 14 days course  · Start doxycycline also.     
Stable  · Continue Tums as needed  
carried
97.8

## 2024-05-20 ENCOUNTER — TRANSCRIBE ORDERS (OUTPATIENT)
Dept: SCHEDULING | Age: 79
End: 2024-05-20

## 2024-05-20 DIAGNOSIS — R91.8 OTHER NONSPECIFIC ABNORMAL FINDING OF LUNG FIELD: Primary | ICD-10-CM

## 2024-06-11 ENCOUNTER — HOSPITAL ENCOUNTER (OUTPATIENT)
Dept: RADIOLOGY | Age: 79
Discharge: HOME | End: 2024-06-11
Attending: NURSE PRACTITIONER
Payer: COMMERCIAL

## 2024-06-11 DIAGNOSIS — R91.8 OTHER NONSPECIFIC ABNORMAL FINDING OF LUNG FIELD: ICD-10-CM

## 2024-06-11 PROCEDURE — 71250 CT THORAX DX C-: CPT

## 2024-06-19 ENCOUNTER — APPOINTMENT (OUTPATIENT)
Age: 79
Setting detail: DERMATOLOGY
End: 2024-06-20

## 2024-06-19 DIAGNOSIS — L81.4 OTHER MELANIN HYPERPIGMENTATION: ICD-10-CM

## 2024-06-19 DIAGNOSIS — L82.1 OTHER SEBORRHEIC KERATOSIS: ICD-10-CM

## 2024-06-19 DIAGNOSIS — D22 MELANOCYTIC NEVI: ICD-10-CM

## 2024-06-19 DIAGNOSIS — L57.0 ACTINIC KERATOSIS: ICD-10-CM

## 2024-06-19 DIAGNOSIS — L20.89 OTHER ATOPIC DERMATITIS: ICD-10-CM

## 2024-06-19 DIAGNOSIS — L57.8 OTHER SKIN CHANGES DUE TO CHRONIC EXPOSURE TO NONIONIZING RADIATION: ICD-10-CM

## 2024-06-19 DIAGNOSIS — D18.0 HEMANGIOMA: ICD-10-CM

## 2024-06-19 DIAGNOSIS — D49.2 NEOPLASM OF UNSPECIFIED BEHAVIOR OF BONE, SOFT TISSUE, AND SKIN: ICD-10-CM

## 2024-06-19 PROBLEM — D22.5 MELANOCYTIC NEVI OF TRUNK: Status: ACTIVE | Noted: 2024-06-19

## 2024-06-19 PROBLEM — D18.01 HEMANGIOMA OF SKIN AND SUBCUTANEOUS TISSUE: Status: ACTIVE | Noted: 2024-06-19

## 2024-06-19 PROCEDURE — 99213 OFFICE O/P EST LOW 20 MIN: CPT | Mod: 25

## 2024-06-19 PROCEDURE — 11102 TANGNTL BX SKIN SINGLE LES: CPT

## 2024-06-19 PROCEDURE — 17003 DESTRUCT PREMALG LES 2-14: CPT

## 2024-06-19 PROCEDURE — 17000 DESTRUCT PREMALG LESION: CPT | Mod: 59

## 2024-06-19 PROCEDURE — OTHER SUNSCREEN RECOMMENDATIONS: OTHER

## 2024-06-19 PROCEDURE — OTHER LIQUID NITROGEN: OTHER

## 2024-06-19 PROCEDURE — OTHER BIOPSY BY SHAVE METHOD: OTHER

## 2024-06-19 PROCEDURE — OTHER COUNSELING: OTHER

## 2024-06-19 PROCEDURE — OTHER PRESCRIPTION: OTHER

## 2024-06-19 RX ORDER — HYDROCORTISONE 25 MG/G
CREAM TOPICAL
Qty: 30 | Refills: 2 | Status: ERX | COMMUNITY
Start: 2024-06-19

## 2024-06-19 ASSESSMENT — PAIN INTENSITY VAS: HOW INTENSE IS YOUR PAIN 0 BEING NO PAIN, 10 BEING THE MOST SEVERE PAIN POSSIBLE?: NO PAIN

## 2024-06-19 ASSESSMENT — LOCATION DETAILED DESCRIPTION DERM
LOCATION DETAILED: EPIGASTRIC SKIN
LOCATION DETAILED: LEFT CENTRAL TEMPLE
LOCATION DETAILED: LOWER STERNUM
LOCATION DETAILED: RIGHT CENTRAL FRONTAL SCALP
LOCATION DETAILED: RIGHT INFERIOR UPPER BACK
LOCATION DETAILED: LEFT CENTRAL FRONTAL SCALP
LOCATION DETAILED: LEFT LATERAL TEMPLE
LOCATION DETAILED: RIGHT MID-UPPER BACK
LOCATION DETAILED: XIPHOID
LOCATION DETAILED: MIDDLE STERNUM
LOCATION DETAILED: LEFT ANTERIOR SCROTUM

## 2024-06-19 ASSESSMENT — LOCATION ZONE DERM
LOCATION ZONE: GENITALIA
LOCATION ZONE: FACE
LOCATION ZONE: SCALP
LOCATION ZONE: TRUNK

## 2024-06-19 ASSESSMENT — LOCATION SIMPLE DESCRIPTION DERM
LOCATION SIMPLE: CHEST
LOCATION SIMPLE: LEFT SCALP
LOCATION SIMPLE: RIGHT SCALP
LOCATION SIMPLE: RIGHT UPPER BACK
LOCATION SIMPLE: LEFT TEMPLE
LOCATION SIMPLE: ABDOMEN
LOCATION SIMPLE: SCROTUM

## 2024-06-19 ASSESSMENT — TOTAL NUMBER OF LESIONS: # OF LESIONS?: 5

## 2024-06-19 ASSESSMENT — SEVERITY ASSESSMENT 2020: SEVERITY 2020: MILD

## 2024-06-19 ASSESSMENT — BSA ECZEMA: % BODY COVERED IN ECZEMA: 10

## 2024-06-19 ASSESSMENT — ITCH NUMERIC RATING SCALE: HOW SEVERE IS YOUR ITCHING?: 5

## 2024-06-19 NOTE — PROCEDURE: LIQUID NITROGEN
Duration Of Freeze Thaw-Cycle (Seconds): 5
Post-Care Instructions: I reviewed with the patient in detail post-care instructions. Patient is to wear sunprotection, and avoid picking at any of the treated lesions. Pt may apply Vaseline to crusted or scabbing areas.
Show Aperture Variable?: Yes
Detail Level: Detailed
Number Of Freeze-Thaw Cycles: 3 freeze-thaw cycles
Render Post-Care Instructions In Note?: no
Consent: The patient's consent was obtained including but not limited to risks of crusting, scabbing, blistering, scarring, darker or lighter pigmentary change, recurrence, incomplete removal and infection.
Application Tool (Optional): Liquid Nitrogen Sprayer

## 2024-06-19 NOTE — PROCEDURE: BIOPSY BY SHAVE METHOD
Detail Level: Detailed
Depth Of Biopsy: dermis
Was A Bandage Applied: Yes
Size Of Lesion In Cm: 0
Biopsy Type: H and E
Biopsy Method: Personna blade
Anesthesia Type: 1% lidocaine without epinephrine
Anesthesia Volume In Cc: 0.5
Hemostasis: Aluminum Chloride
Wound Care: Petrolatum
Dressing: bandage
Destruction After The Procedure: No
Type Of Destruction Used: Curettage
Curettage Text: The wound bed was treated with curettage after the biopsy was performed.
Cryotherapy Text: The wound bed was treated with cryotherapy after the biopsy was performed.
Electrodesiccation Text: The wound bed was treated with electrodesiccation after the biopsy was performed.
Electrodesiccation And Curettage Text: The wound bed was treated with electrodesiccation and curettage after the biopsy was performed.
Silver Nitrate Text: The wound bed was treated with silver nitrate after the biopsy was performed.
Lab: -[id number]
Consent: Written consent was obtained and risks were reviewed including but not limited to scarring, infection, bleeding, scabbing, incomplete removal, nerve damage and allergy to anesthesia.
Post-Care Instructions: I reviewed with the patient in detail post-care instructions. Patient is to keep the biopsy site dry overnight.  Cleanse daily with soap and water and then apply Petrolatum and cover with a bandage for 3-5 days.
Notification Instructions: Patient will be notified of biopsy results. However, patient instructed to call the office if not contacted within 2 weeks.
Billing Type: Third-Party Bill
Information: Selecting Yes will display possible errors in your note based on the variables you have selected. This validation is only offered as a suggestion for you. PLEASE NOTE THAT THE VALIDATION TEXT WILL BE REMOVED WHEN YOU FINALIZE YOUR NOTE. IF YOU WANT TO FAX A PRELIMINARY NOTE YOU WILL NEED TO TOGGLE THIS TO 'NO' IF YOU DO NOT WANT IT IN YOUR FAXED NOTE.

## 2024-06-19 NOTE — PROCEDURE: COUNSELING
Detail Level: Generalized
Detail Level: Zone
Patient Specific Counseling (Will Not Stick From Patient To Patient): -Patient is also managing symptoms with Opzelura\\n-Tolerating well without AE's and is well controlled on treatment
Detail Level: Detailed

## 2024-07-09 RX ORDER — KETOCONAZOLE 20 MG/ML
SHAMPOO, SUSPENSION TOPICAL
Qty: 120 ML | Refills: 1 | Status: SHIPPED | OUTPATIENT
Start: 2024-07-09 | End: 2025-02-10 | Stop reason: SDUPTHER

## 2024-07-09 RX ORDER — ALBUTEROL SULFATE 90 UG/1
2 INHALANT RESPIRATORY (INHALATION) EVERY 6 HOURS PRN
Qty: 18 G | Refills: 1 | Status: SHIPPED | OUTPATIENT
Start: 2024-07-09 | End: 2025-03-24 | Stop reason: SDUPTHER

## 2024-08-12 RX ORDER — TRIAMCINOLONE ACETONIDE 1 MG/G
CREAM TOPICAL 2 TIMES DAILY PRN
Qty: 454 G | Refills: 1 | Status: SHIPPED | OUTPATIENT
Start: 2024-08-12 | End: 2024-09-11

## 2024-08-12 RX ORDER — TACROLIMUS 1 MG/G
OINTMENT TOPICAL DAILY
Qty: 100 G | Refills: 3 | Status: SHIPPED | OUTPATIENT
Start: 2024-08-12

## 2024-08-26 ENCOUNTER — APPOINTMENT (OUTPATIENT)
Age: 79
Setting detail: DERMATOLOGY
End: 2024-08-27

## 2024-08-26 DIAGNOSIS — L28.0 LICHEN SIMPLEX CHRONICUS: ICD-10-CM

## 2024-08-26 DIAGNOSIS — L20.89 OTHER ATOPIC DERMATITIS: ICD-10-CM

## 2024-08-26 DIAGNOSIS — B35.6 TINEA CRURIS: ICD-10-CM

## 2024-08-26 PROCEDURE — OTHER COUNSELING: OTHER

## 2024-08-26 PROCEDURE — 99214 OFFICE O/P EST MOD 30 MIN: CPT

## 2024-08-26 PROCEDURE — OTHER PRESCRIPTION: OTHER

## 2024-08-26 PROCEDURE — OTHER PRESCRIPTION MEDICATION MANAGEMENT: OTHER

## 2024-08-26 RX ORDER — CLOTRIMAZOLE AND BETAMETHASONE DIPROPIONATE 10; .5 MG/G; MG/G
CREAM TOPICAL
Qty: 45 | Refills: 1 | Status: ERX | COMMUNITY
Start: 2024-08-26

## 2024-08-26 ASSESSMENT — LOCATION ZONE DERM
LOCATION ZONE: SCROTUM
LOCATION ZONE: TRUNK
LOCATION ZONE: LEG

## 2024-08-26 ASSESSMENT — LOCATION DETAILED DESCRIPTION DERM
LOCATION DETAILED: LEFT INFERIOR LATERAL SCROTUM
LOCATION DETAILED: LEFT POSTERIOR THIGH
LOCATION DETAILED: RIGHT MEDIAL POSTERIOR THIGH
LOCATION DETAILED: STERNUM
LOCATION DETAILED: RIGHT INFERIOR MEDIAL MIDBACK

## 2024-08-26 ASSESSMENT — LOCATION SIMPLE DESCRIPTION DERM
LOCATION SIMPLE: RIGHT THIGH
LOCATION SIMPLE: LEFT SCROTUM
LOCATION SIMPLE: RIGHT LOWER BACK
LOCATION SIMPLE: CHEST
LOCATION SIMPLE: LEFT THIGH

## 2024-08-26 ASSESSMENT — ITCH NUMERIC RATING SCALE: HOW SEVERE IS YOUR ITCHING?: 3

## 2024-08-26 ASSESSMENT — BSA RASH: BSA RASH: 3

## 2024-08-26 ASSESSMENT — BSA ECZEMA: % BODY COVERED IN ECZEMA: 15

## 2024-08-26 NOTE — PROCEDURE: PRESCRIPTION MEDICATION MANAGEMENT
Detail Level: Zone
Render In Strict Bullet Format?: No
Initiate Treatment: Clotramizole/ betamethasone cream twice daily x 2 weeks
Continue Regimen: Continue Opzelura cream twice daily x 2 weeks  to lesions on upper chest and small patch on lower back prescribed by previous provider. Discussed with patient he should only apply to a small BSA at a time ( less than 20% for up to 8 weeks straight). To consider Dupixent if refractory to treatment.

## 2024-08-26 NOTE — PROCEDURE: COUNSELING
Detail Level: Zone
Patient Specific Counseling (Will Not Stick From Patient To Patient): -Patient is also managing symptoms with Opzelura\\n-Tolerating well without AE's and is well controlled on treatment
Detail Level: Detailed

## 2024-09-23 ENCOUNTER — OFFICE VISIT (OUTPATIENT)
Dept: INTERNAL MEDICINE | Facility: CLINIC | Age: 79
End: 2024-09-23
Payer: COMMERCIAL

## 2024-09-23 VITALS
OXYGEN SATURATION: 98 % | WEIGHT: 194 LBS | TEMPERATURE: 97.6 F | SYSTOLIC BLOOD PRESSURE: 140 MMHG | HEART RATE: 89 BPM | DIASTOLIC BLOOD PRESSURE: 76 MMHG | BODY MASS INDEX: 26.31 KG/M2

## 2024-09-23 DIAGNOSIS — R25.1 TREMOR: ICD-10-CM

## 2024-09-23 DIAGNOSIS — J45.909 ASTHMA, UNSPECIFIED ASTHMA SEVERITY, UNSPECIFIED WHETHER COMPLICATED, UNSPECIFIED WHETHER PERSISTENT: ICD-10-CM

## 2024-09-23 DIAGNOSIS — Z00.00 HEALTHCARE MAINTENANCE: ICD-10-CM

## 2024-09-23 DIAGNOSIS — R21 RASH: Primary | ICD-10-CM

## 2024-09-23 DIAGNOSIS — R53.83 OTHER FATIGUE: ICD-10-CM

## 2024-09-23 DIAGNOSIS — E78.5 HYPERLIPIDEMIA, UNSPECIFIED HYPERLIPIDEMIA TYPE: ICD-10-CM

## 2024-09-23 LAB
ALBUMIN SERPL-MCNC: 4.4 G/DL (ref 3.5–5.7)
ALP SERPL-CCNC: 51 IU/L (ref 34–125)
ALT SERPL-CCNC: 14 IU/L (ref 7–52)
ANION GAP SERPL CALC-SCNC: 7 MEQ/L (ref 3–15)
AST SERPL-CCNC: 17 IU/L (ref 13–39)
BASOPHILS # BLD: 0.1 K/UL (ref 0.01–0.1)
BASOPHILS NFR BLD: 1.2 %
BILIRUB SERPL-MCNC: 0.9 MG/DL (ref 0.3–1.2)
BUN SERPL-MCNC: 12 MG/DL (ref 7–25)
CALCIUM SERPL-MCNC: 9.6 MG/DL (ref 8.6–10.3)
CHLORIDE SERPL-SCNC: 104 MEQ/L (ref 98–107)
CHOLEST SERPL-MCNC: 195 MG/DL
CO2 SERPL-SCNC: 26 MEQ/L (ref 21–31)
CREAT SERPL-MCNC: 0.9 MG/DL (ref 0.7–1.3)
DIFFERENTIAL METHOD BLD: ABNORMAL
EGFRCR SERPLBLD CKD-EPI 2021: >60 ML/MIN/1.73M*2
EOSINOPHIL # BLD: 0.61 K/UL (ref 0.04–0.54)
EOSINOPHIL NFR BLD: 7.2 %
ERYTHROCYTE [DISTWIDTH] IN BLOOD BY AUTOMATED COUNT: 13.3 % (ref 11.6–14.4)
GLUCOSE SERPL-MCNC: 98 MG/DL (ref 70–99)
HCT VFR BLD AUTO: 48.8 % (ref 40.1–51)
HDLC SERPL-MCNC: 52 MG/DL
HDLC SERPL: 3.8 {RATIO}
HGB BLD-MCNC: 16.3 G/DL (ref 13.7–17.5)
IMM GRANULOCYTES # BLD AUTO: 0.04 K/UL (ref 0–0.08)
IMM GRANULOCYTES NFR BLD AUTO: 0.5 %
LDLC SERPL CALC-MCNC: 122 MG/DL
LYMPHOCYTES # BLD: 1.75 K/UL (ref 1.2–3.5)
LYMPHOCYTES NFR BLD: 20.8 %
MCH RBC QN AUTO: 30.5 PG (ref 28–33.2)
MCHC RBC AUTO-ENTMCNC: 33.4 G/DL (ref 32.2–36.5)
MCV RBC AUTO: 91.2 FL (ref 83–98)
MONOCYTES # BLD: 0.65 K/UL (ref 0.3–1)
MONOCYTES NFR BLD: 7.7 %
NEUTROPHILS # BLD: 5.28 K/UL (ref 1.7–7)
NEUTS SEG NFR BLD: 62.6 %
NONHDLC SERPL-MCNC: 143 MG/DL
NRBC BLD-RTO: 0 %
PDW BLD AUTO: 9.6 FL (ref 9.4–12.4)
PLATELET # BLD AUTO: 212 K/UL (ref 150–350)
POTASSIUM SERPL-SCNC: 4.4 MEQ/L (ref 3.5–5.1)
PROT SERPL-MCNC: 7 G/DL (ref 6–8.2)
RBC # BLD AUTO: 5.35 M/UL (ref 4.5–5.8)
SODIUM SERPL-SCNC: 137 MEQ/L (ref 136–145)
TRIGL SERPL-MCNC: 103 MG/DL
TSH SERPL DL<=0.05 MIU/L-ACNC: 1.37 MIU/L (ref 0.34–5.6)
WBC # BLD AUTO: 8.43 K/UL (ref 3.8–10.5)

## 2024-09-23 PROCEDURE — 80053 COMPREHEN METABOLIC PANEL: CPT | Performed by: INTERNAL MEDICINE

## 2024-09-23 PROCEDURE — 84443 ASSAY THYROID STIM HORMONE: CPT | Performed by: INTERNAL MEDICINE

## 2024-09-23 PROCEDURE — G0008 ADMIN INFLUENZA VIRUS VAC: HCPCS | Performed by: INTERNAL MEDICINE

## 2024-09-23 PROCEDURE — 85025 COMPLETE CBC W/AUTO DIFF WBC: CPT | Performed by: INTERNAL MEDICINE

## 2024-09-23 PROCEDURE — 36415 COLL VENOUS BLD VENIPUNCTURE: CPT | Performed by: INTERNAL MEDICINE

## 2024-09-23 PROCEDURE — 3008F BODY MASS INDEX DOCD: CPT | Performed by: INTERNAL MEDICINE

## 2024-09-23 PROCEDURE — 90662 IIV NO PRSV INCREASED AG IM: CPT | Performed by: INTERNAL MEDICINE

## 2024-09-23 PROCEDURE — 80061 LIPID PANEL: CPT | Performed by: INTERNAL MEDICINE

## 2024-09-23 PROCEDURE — 200200 PR NO CHARGE: Performed by: INTERNAL MEDICINE

## 2024-09-23 PROCEDURE — 99214 OFFICE O/P EST MOD 30 MIN: CPT | Mod: 25 | Performed by: INTERNAL MEDICINE

## 2024-09-23 RX ORDER — DOXYCYCLINE HYCLATE 100 MG
100 TABLET ORAL 2 TIMES DAILY
Qty: 20 TABLET | Refills: 0 | Status: SHIPPED | OUTPATIENT
Start: 2024-09-23 | End: 2025-03-24 | Stop reason: SDUPTHER

## 2024-09-23 RX ORDER — FLUTICASONE PROPIONATE AND SALMETEROL 100; 50 UG/1; UG/1
1 POWDER RESPIRATORY (INHALATION)
Start: 2024-09-23

## 2024-09-23 RX ORDER — NYSTATIN 100000 [USP'U]/G
POWDER TOPICAL 2 TIMES DAILY
Qty: 60 G | Refills: 1 | Status: SHIPPED | OUTPATIENT
Start: 2024-09-23 | End: 2025-03-24 | Stop reason: SDUPTHER

## 2024-09-23 ASSESSMENT — ENCOUNTER SYMPTOMS
CHILLS: 0
ABDOMINAL PAIN: 0
FEVER: 0

## 2024-09-23 ASSESSMENT — PATIENT HEALTH QUESTIONNAIRE - PHQ9: SUM OF ALL RESPONSES TO PHQ9 QUESTIONS 1 & 2: 0

## 2024-09-23 NOTE — PATIENT INSTRUCTIONS
Rash  Start doxycyline  Start nystatin  Follow up with dermatology as scheduled next month    Asthma  Continue generic advair  Continue albuterol as needed  Continue motelukast    Healthcare maintenance  Can get RSV vaccine at the pharmacy  Flu vaccine today    Return in about 6 months (around 3/23/2025).

## 2024-09-23 NOTE — PROGRESS NOTES
Chief Complaint   Patient presents with    Follow-up       Subjective      Patient ID: Zaki Wilder is a 79 y.o. male.    HPI    Scrotal rash: Saw dermatology in the past.  Biopsy was benign  Clotrimazole is helping.  Rash is back again    Asthma: Sees pulmonology.  Controlled currently    Tremor: Stable.  No worsening      Objective     Vitals:    09/23/24 0900   BP: (!) 140/76   BP Location: Left upper arm   Patient Position: Sitting   Pulse: 89   Temp: 36.4 °C (97.6 °F)   TempSrc: Oral   SpO2: 98%   Weight: 88 kg (194 lb)       The following have been reviewed and updated as appropriate in this visit:   Allergies  Meds  Problems        Review of Systems   Constitutional:  Negative for chills and fever.   Cardiovascular:  Negative for chest pain.   Gastrointestinal:  Negative for abdominal pain.         Current Outpatient Medications:     azelastine 137 mcg (0.1 %) nasal spray, Administer 1 spray into each nostril daily., Disp: 30 mL, Rfl: 11    doxycycline hyclate (VIBRA-TABS) 100 mg tablet, Take 1 tablet (100 mg total) by mouth 2 (two) times a day for 10 days., Disp: 20 tablet, Rfl: 0    fluticasone propion-salmeteroL (ADVAIR DISKUS) 100-50 mcg/dose diskus inhaler, Inhale 1 puff 2 (two) times a day., Disp: , Rfl:     ketoconazole (NIZORAL) 2 % shampoo, apply topically TWICE A WEEK (MON, THU). apply to DAMP SKIN, LATH...  (REFER TO PRESCRIPTION NOTES)., Disp: 120 mL, Rfl: 1    montelukast (SINGULAIR) 10 mg tablet, Take 10 mg by mouth daily., Disp: , Rfl:     nystatin (MYCOSTATIN) 100,000 unit/gram powder, Apply topically 2 (two) times a day., Disp: 60 g, Rfl: 1    ruxolitinib (OPZELURA) 1.5 % cream, Apply topically., Disp: , Rfl:     tacrolimus (PROTOPIC) 0.1 % ointment, Apply topically daily., Disp: 100 g, Rfl: 3    albuterol HFA 90 mcg/actuation inhaler, Inhale 2 puffs every 6 (six) hours as needed for wheezing., Disp: 18 g, Rfl: 1    clobetasoL (TEMOVATE) 0.05 % cream, Apply topically 2 (two) times a day.,  Disp: 60 g, Rfl: 0    fluticasone propionate (FLONASE) 50 mcg/actuation nasal spray, Administer 2 sprays into each nostril daily as needed for rhinitis., Disp: , Rfl:     triamcinolone (KENALOG) 0.1 % cream, Apply topically 2 (two) times a day as needed for irritation., Disp: 454 g, Rfl: 1    Physical Exam  Vitals reviewed.   Constitutional:       General: He is not in acute distress.     Appearance: He is well-developed.   HENT:      Head: Normocephalic and atraumatic.   Eyes:      Conjunctiva/sclera: Conjunctivae normal.   Cardiovascular:      Rate and Rhythm: Normal rate and regular rhythm.   Pulmonary:      Effort: Pulmonary effort is normal. No respiratory distress.      Breath sounds: Normal breath sounds. No wheezing or rales.   Abdominal:      General: There is no distension.      Palpations: Abdomen is soft.      Tenderness: There is no abdominal tenderness. There is no guarding.   Genitourinary:     Comments: Erythema of left scrotum  Musculoskeletal:         General: No swelling.   Neurological:      Mental Status: He is alert. Mental status is at baseline.   Psychiatric:         Mood and Affect: Mood normal.         Behavior: Behavior normal.         Assessment/Plan   Rash  On scrotum.  Had biopsy done by dermatology in past which was benign per patient  Likely fungal versus cellulitis also  Start doxycyline  Start nystatin  Follow up with dermatology as scheduled next month    Asthma  Doing well  Sees pulmonology  Continue generic advair  Continue albuterol as needed  Continue motelukast    Healthcare maintenance  Can get RSV vaccine at the pharmacy  Flu vaccine today    Tremor  Stable  Has not worsened      Orders Placed This Encounter   Procedures    Influenza vaccine high dose trivalent 65 and older IM (Fluzone High Dose)    CBC and Differential    Comprehensive metabolic panel    TSH w reflex FT4    Lipid panel     New Medications Ordered This Visit   Medications    doxycycline hyclate (VIBRA-TABS)  100 mg tablet     Sig: Take 1 tablet (100 mg total) by mouth 2 (two) times a day for 10 days.     Dispense:  20 tablet     Refill:  0    nystatin (MYCOSTATIN) 100,000 unit/gram powder     Sig: Apply topically 2 (two) times a day.     Dispense:  60 g     Refill:  1    fluticasone propion-salmeteroL (ADVAIR DISKUS) 100-50 mcg/dose diskus inhaler     Sig: Inhale 1 puff 2 (two) times a day.       Return in about 6 months (around 3/23/2025).     LATOYA GRANDA MD

## 2024-09-23 NOTE — ASSESSMENT & PLAN NOTE
On scrotum.  Had biopsy done by dermatology in past which was benign per patient  Likely fungal versus cellulitis also  Start doxycyline  Start nystatin  Follow up with dermatology as scheduled next month

## 2024-09-23 NOTE — ASSESSMENT & PLAN NOTE
Doing well  Sees pulmonology  Continue generic advair  Continue albuterol as needed  Continue motelukast

## 2024-10-30 ENCOUNTER — APPOINTMENT (OUTPATIENT)
Age: 79
Setting detail: DERMATOLOGY
End: 2024-10-30

## 2024-10-30 DIAGNOSIS — L28.0 LICHEN SIMPLEX CHRONICUS: ICD-10-CM

## 2024-10-30 DIAGNOSIS — L20.89 OTHER ATOPIC DERMATITIS: ICD-10-CM

## 2024-10-30 PROCEDURE — 99213 OFFICE O/P EST LOW 20 MIN: CPT

## 2024-10-30 PROCEDURE — OTHER COUNSELING: OTHER

## 2024-10-30 PROCEDURE — OTHER PRESCRIPTION: OTHER

## 2024-10-30 PROCEDURE — OTHER PRESCRIPTION MEDICATION MANAGEMENT: OTHER

## 2024-10-30 RX ORDER — TRIAMCINOLONE ACETONIDE 1 MG/G
OINTMENT TOPICAL
Qty: 80 | Refills: 0 | Status: ERX | COMMUNITY
Start: 2024-10-30

## 2024-10-30 ASSESSMENT — BSA ECZEMA: % BODY COVERED IN ECZEMA: 13

## 2024-10-30 ASSESSMENT — LOCATION SIMPLE DESCRIPTION DERM
LOCATION SIMPLE: SCROTUM
LOCATION SIMPLE: RIGHT LOWER BACK
LOCATION SIMPLE: LEFT SCROTUM
LOCATION SIMPLE: RIGHT LOWER BACK

## 2024-10-30 ASSESSMENT — LOCATION ZONE DERM
LOCATION ZONE: TRUNK
LOCATION ZONE: TRUNK
LOCATION ZONE: GENITALIA
LOCATION ZONE: SCROTUM

## 2024-10-30 ASSESSMENT — ITCH NUMERIC RATING SCALE: HOW SEVERE IS YOUR ITCHING?: 0

## 2024-10-30 ASSESSMENT — LOCATION DETAILED DESCRIPTION DERM
LOCATION DETAILED: RIGHT INFERIOR MEDIAL MIDBACK
LOCATION DETAILED: LEFT ANTERIOR SCROTUM
LOCATION DETAILED: LEFT INFERIOR LATERAL SCROTUM
LOCATION DETAILED: RIGHT SUPERIOR MEDIAL LOWER BACK

## 2024-10-30 ASSESSMENT — BSA RASH: BSA RASH: 1

## 2024-10-30 NOTE — PROCEDURE: PRESCRIPTION MEDICATION MANAGEMENT
Detail Level: Zone
Render In Strict Bullet Format?: No
Continue Regimen: Continue Opzelura cream twice daily x 2 weeks  to lesions on upper chest and small patch on lower back prescribed by previous provider. Discussed with patient he should only apply to a small BSA at a time (less than 20% for up to 8 weeks straight). To consider Dupixent if refractory to treatment.

## 2024-10-30 NOTE — PROCEDURE: COUNSELING
Detail Level: Zone
Patient Specific Counseling (Will Not Stick From Patient To Patient): -Patient is also managing symptoms with Opzelura\\n-Tolerating well without AE's and is well controlled on treatment
Detail Level: Detailed
Patient Specific Counseling (Will Not Stick From Patient To Patient): -Counseled patient on increased risk of striae and skin atrophy with prolonged use of topical steroids

## 2024-11-27 ENCOUNTER — APPOINTMENT (OUTPATIENT)
Age: 79
Setting detail: DERMATOLOGY
End: 2024-12-02

## 2024-11-27 DIAGNOSIS — R21 RASH AND OTHER NONSPECIFIC SKIN ERUPTION: ICD-10-CM

## 2024-11-27 DIAGNOSIS — L20.89 OTHER ATOPIC DERMATITIS: ICD-10-CM

## 2024-11-27 DIAGNOSIS — L57.0 ACTINIC KERATOSIS: ICD-10-CM

## 2024-11-27 PROCEDURE — OTHER COUNSELING: OTHER

## 2024-11-27 PROCEDURE — 17000 DESTRUCT PREMALG LESION: CPT

## 2024-11-27 PROCEDURE — OTHER BIOPSY BY SHAVE METHOD: OTHER

## 2024-11-27 PROCEDURE — 54100 BIOPSY OF PENIS: CPT

## 2024-11-27 PROCEDURE — OTHER LIQUID NITROGEN: OTHER

## 2024-11-27 PROCEDURE — OTHER PRESCRIPTION MEDICATION MANAGEMENT: OTHER

## 2024-11-27 PROCEDURE — 99213 OFFICE O/P EST LOW 20 MIN: CPT | Mod: 25

## 2024-11-27 ASSESSMENT — LOCATION ZONE DERM
LOCATION ZONE: TRUNK
LOCATION ZONE: PENIS
LOCATION ZONE: NOSE
LOCATION ZONE: TRUNK

## 2024-11-27 ASSESSMENT — LOCATION SIMPLE DESCRIPTION DERM
LOCATION SIMPLE: RIGHT LOWER BACK
LOCATION SIMPLE: RIGHT LOWER BACK
LOCATION SIMPLE: NOSE
LOCATION SIMPLE: PENIS
LOCATION SIMPLE: CHEST

## 2024-11-27 ASSESSMENT — LOCATION DETAILED DESCRIPTION DERM
LOCATION DETAILED: NASAL DORSUM
LOCATION DETAILED: LEFT MEDIAL SUPERIOR CHEST
LOCATION DETAILED: RIGHT SUPERIOR MEDIAL LOWER BACK
LOCATION DETAILED: LEFT DORSAL SHAFT OF PENIS
LOCATION DETAILED: RIGHT INFERIOR MEDIAL MIDBACK

## 2024-11-27 ASSESSMENT — PAIN INTENSITY VAS: HOW INTENSE IS YOUR PAIN 0 BEING NO PAIN, 10 BEING THE MOST SEVERE PAIN POSSIBLE?: NO PAIN

## 2024-11-27 ASSESSMENT — ITCH NUMERIC RATING SCALE: HOW SEVERE IS YOUR ITCHING?: 8

## 2024-11-27 ASSESSMENT — BSA RASH: BSA RASH: 2

## 2024-11-27 ASSESSMENT — SEVERITY ASSESSMENT: SEVERITY: MILD

## 2024-11-27 ASSESSMENT — BSA ECZEMA: % BODY COVERED IN ECZEMA: 10

## 2024-11-27 ASSESSMENT — SEVERITY ASSESSMENT 2020: SEVERITY 2020: MILD

## 2024-11-27 NOTE — PROCEDURE: LIQUID NITROGEN
Duration Of Freeze Thaw-Cycle (Seconds): 5
Detail Level: Detailed
Application Tool (Optional): Liquid Nitrogen Sprayer
Post-Care Instructions: I reviewed with the patient in detail post-care instructions. Patient is to wear sunprotection, and avoid picking at any of the treated lesions. Pt may apply Vaseline to crusted or scabbing areas.
Render Post-Care Instructions In Note?: no
Show Aperture Variable?: Yes
Consent: The patient's consent was obtained including but not limited to risks of crusting, scabbing, blistering, scarring, darker or lighter pigmentary change, recurrence, incomplete removal and infection.
Number Of Freeze-Thaw Cycles: 3 freeze-thaw cycles

## 2024-11-27 NOTE — PROCEDURE: BIOPSY BY SHAVE METHOD
Detail Level: Detailed
Depth Of Biopsy: dermis
Was A Bandage Applied: Yes
Size Of Lesion In Cm: 0
Biopsy Type: H and E
Biopsy Method: Personna blade
Anesthesia Type: 1% lidocaine with epinephrine
Anesthesia Volume In Cc: 0.5
Hemostasis: Aluminum Chloride
Wound Care: Petrolatum
Dressing: bandage
Destruction After The Procedure: No
Type Of Destruction Used: Curettage
Curettage Text: The wound bed was treated with curettage after the biopsy was performed.
Cryotherapy Text: The wound bed was treated with cryotherapy after the biopsy was performed.
Electrodesiccation Text: The wound bed was treated with electrodesiccation after the biopsy was performed.
Electrodesiccation And Curettage Text: The wound bed was treated with electrodesiccation and curettage after the biopsy was performed.
Silver Nitrate Text: The wound bed was treated with silver nitrate after the biopsy was performed.
Lab: -[id number]
Consent: Written consent was obtained and risks were reviewed including but not limited to scarring, infection, bleeding, scabbing, incomplete removal, nerve damage and allergy to anesthesia.
Post-Care Instructions: I reviewed with the patient in detail post-care instructions. Patient is to keep the biopsy site dry overnight.  Cleanse daily with soap and water and then apply Petrolatum and cover with a bandage for 3-5 days.
Notification Instructions: Patient will be notified of biopsy results. However, patient instructed to call the office if not contacted within 2 weeks.
Billing Type: Third-Party Bill
Information: Selecting Yes will display possible errors in your note based on the variables you have selected. This validation is only offered as a suggestion for you. PLEASE NOTE THAT THE VALIDATION TEXT WILL BE REMOVED WHEN YOU FINALIZE YOUR NOTE. IF YOU WANT TO FAX A PRELIMINARY NOTE YOU WILL NEED TO TOGGLE THIS TO 'NO' IF YOU DO NOT WANT IT IN YOUR FAXED NOTE.

## 2024-11-27 NOTE — PROCEDURE: PRESCRIPTION MEDICATION MANAGEMENT
Detail Level: Zone
Render In Strict Bullet Format?: No
Discontinue Regimen: Opzelura
Initiate Treatment: Triamcinolone acetonide 0.1% cream to affected areas on back and chest BID x 10 days

## 2024-12-03 ENCOUNTER — RX ONLY (RX ONLY)
Age: 79
End: 2024-12-03

## 2024-12-03 RX ORDER — RUXOLITINIB 15 MG/G
CREAM TOPICAL
Qty: 60 | Refills: 11 | Status: ERX | COMMUNITY
Start: 2024-12-03

## 2024-12-23 ENCOUNTER — APPOINTMENT (OUTPATIENT)
Age: 79
Setting detail: DERMATOLOGY
End: 2024-12-23

## 2024-12-23 DIAGNOSIS — B00.1 HERPESVIRAL VESICULAR DERMATITIS: ICD-10-CM

## 2024-12-23 PROCEDURE — OTHER COUNSELING: OTHER

## 2024-12-23 PROCEDURE — 99213 OFFICE O/P EST LOW 20 MIN: CPT

## 2024-12-23 PROCEDURE — OTHER PRESCRIPTION: OTHER

## 2024-12-23 RX ORDER — VALACYCLOVIR 1 G/1
TABLET, FILM COATED ORAL
Qty: 20 | Refills: 0 | Status: ERX | COMMUNITY
Start: 2024-12-23

## 2024-12-23 ASSESSMENT — LOCATION SIMPLE DESCRIPTION DERM: LOCATION SIMPLE: SCROTUM

## 2024-12-23 ASSESSMENT — LOCATION ZONE DERM: LOCATION ZONE: GENITALIA

## 2024-12-23 ASSESSMENT — LOCATION DETAILED DESCRIPTION DERM: LOCATION DETAILED: LEFT ANTERIOR SCROTUM

## 2025-01-08 ENCOUNTER — APPOINTMENT (OUTPATIENT)
Age: 80
Setting detail: DERMATOLOGY
End: 2025-01-08

## 2025-01-08 DIAGNOSIS — A60.9 ANOGENITAL HERPESVIRAL INFECTION, UNSPECIFIED: ICD-10-CM

## 2025-01-08 PROCEDURE — OTHER COUNSELING: OTHER

## 2025-01-08 PROCEDURE — OTHER PRESCRIPTION: OTHER

## 2025-01-08 PROCEDURE — 99213 OFFICE O/P EST LOW 20 MIN: CPT

## 2025-01-08 RX ORDER — VALACYCLOVIR 500 MG/1
TABLET, FILM COATED ORAL
Qty: 30 | Refills: 3 | Status: ERX | COMMUNITY
Start: 2025-01-08

## 2025-01-08 ASSESSMENT — LOCATION ZONE DERM: LOCATION ZONE: GENITALIA

## 2025-01-08 ASSESSMENT — LOCATION DETAILED DESCRIPTION DERM: LOCATION DETAILED: LEFT ANTERIOR SCROTUM

## 2025-01-08 ASSESSMENT — LOCATION SIMPLE DESCRIPTION DERM: LOCATION SIMPLE: SCROTUM

## 2025-01-09 ENCOUNTER — RX ONLY (RX ONLY)
Age: 80
End: 2025-01-09

## 2025-01-09 RX ORDER — RUXOLITINIB 15 MG/G
CREAM TOPICAL
Qty: 60 | Refills: 11 | Status: ERX

## 2025-01-29 ENCOUNTER — APPOINTMENT (OUTPATIENT)
Age: 80
Setting detail: DERMATOLOGY
End: 2025-01-29

## 2025-01-29 DIAGNOSIS — A60.9 ANOGENITAL HERPESVIRAL INFECTION, UNSPECIFIED: ICD-10-CM

## 2025-01-29 PROCEDURE — OTHER PRESCRIPTION MEDICATION MANAGEMENT: OTHER

## 2025-01-29 PROCEDURE — OTHER PRESCRIPTION: OTHER

## 2025-01-29 PROCEDURE — 99213 OFFICE O/P EST LOW 20 MIN: CPT

## 2025-01-29 PROCEDURE — OTHER COUNSELING: OTHER

## 2025-01-29 RX ORDER — VALACYCLOVIR HYDROCHLORIDE 1 G/1
TABLET, FILM COATED ORAL BID
Qty: 30 | Refills: 1 | Status: ERX | COMMUNITY
Start: 2025-01-29

## 2025-01-29 ASSESSMENT — LOCATION SIMPLE DESCRIPTION DERM: LOCATION SIMPLE: SCROTUM

## 2025-01-29 ASSESSMENT — LOCATION DETAILED DESCRIPTION DERM: LOCATION DETAILED: LEFT ANTERIOR SCROTUM

## 2025-01-29 ASSESSMENT — LOCATION ZONE DERM: LOCATION ZONE: GENITALIA

## 2025-01-29 NOTE — PROCEDURE: PRESCRIPTION MEDICATION MANAGEMENT
Modify Regimen: Increase Valtrex 500mg to 1gm daily
Render In Strict Bullet Format?: No
Plan: Return to office in 4-6 weeks to evaluate treatment response to increased dosage
Detail Level: Zone

## 2025-02-10 RX ORDER — KETOCONAZOLE 20 MG/ML
SHAMPOO, SUSPENSION TOPICAL
Qty: 120 ML | Refills: 1 | Status: SHIPPED | OUTPATIENT
Start: 2025-02-10

## 2025-02-26 ENCOUNTER — APPOINTMENT (OUTPATIENT)
Age: 80
Setting detail: DERMATOLOGY
End: 2025-02-26

## 2025-02-26 DIAGNOSIS — A60.9 ANOGENITAL HERPESVIRAL INFECTION, UNSPECIFIED: ICD-10-CM

## 2025-02-26 PROCEDURE — OTHER COUNSELING: OTHER

## 2025-02-26 PROCEDURE — OTHER PRESCRIPTION MEDICATION MANAGEMENT: OTHER

## 2025-02-26 PROCEDURE — 99213 OFFICE O/P EST LOW 20 MIN: CPT

## 2025-02-26 PROCEDURE — OTHER PRESCRIPTION: OTHER

## 2025-02-26 RX ORDER — VALACYCLOVIR HYDROCHLORIDE 1 G/1
TABLET, FILM COATED ORAL BID
Qty: 30 | Refills: 1 | Status: ERX

## 2025-02-26 ASSESSMENT — LOCATION DETAILED DESCRIPTION DERM: LOCATION DETAILED: LEFT ANTERIOR SCROTUM

## 2025-02-26 ASSESSMENT — LOCATION ZONE DERM: LOCATION ZONE: GENITALIA

## 2025-02-26 ASSESSMENT — LOCATION SIMPLE DESCRIPTION DERM: LOCATION SIMPLE: SCROTUM

## 2025-02-26 NOTE — PROCEDURE: PRESCRIPTION MEDICATION MANAGEMENT
Continue Regimen: Continue taking Valtrex 1gm daily for 3 months
Render In Strict Bullet Format?: No
Plan: Return to office in 4-6 weeks to evaluate treatment response to increased dosage
Detail Level: Zone

## 2025-03-24 ENCOUNTER — OFFICE VISIT (OUTPATIENT)
Dept: INTERNAL MEDICINE | Facility: CLINIC | Age: 80
End: 2025-03-24
Payer: COMMERCIAL

## 2025-03-24 VITALS
WEIGHT: 198.8 LBS | OXYGEN SATURATION: 98 % | HEART RATE: 88 BPM | RESPIRATION RATE: 16 BRPM | BODY MASS INDEX: 26.96 KG/M2 | TEMPERATURE: 98.3 F | SYSTOLIC BLOOD PRESSURE: 138 MMHG | DIASTOLIC BLOOD PRESSURE: 70 MMHG

## 2025-03-24 DIAGNOSIS — J45.909 ASTHMA, UNSPECIFIED ASTHMA SEVERITY, UNSPECIFIED WHETHER COMPLICATED, UNSPECIFIED WHETHER PERSISTENT: ICD-10-CM

## 2025-03-24 DIAGNOSIS — R41.3 MEMORY CHANGE: ICD-10-CM

## 2025-03-24 DIAGNOSIS — B00.9 HSV (HERPES SIMPLEX VIRUS) INFECTION: Primary | ICD-10-CM

## 2025-03-24 DIAGNOSIS — R21 RASH: ICD-10-CM

## 2025-03-24 PROBLEM — Z00.00 GENERAL MEDICAL EXAM: Status: RESOLVED | Noted: 2019-06-14 | Resolved: 2025-03-24

## 2025-03-24 PROCEDURE — 99214 OFFICE O/P EST MOD 30 MIN: CPT | Performed by: INTERNAL MEDICINE

## 2025-03-24 PROCEDURE — 3008F BODY MASS INDEX DOCD: CPT | Performed by: INTERNAL MEDICINE

## 2025-03-24 PROCEDURE — 1157F ADVNC CARE PLAN IN RCRD: CPT | Performed by: INTERNAL MEDICINE

## 2025-03-24 RX ORDER — VALACYCLOVIR HYDROCHLORIDE 500 MG/1
1000 TABLET, FILM COATED ORAL DAILY
COMMUNITY
End: 2025-03-24 | Stop reason: SDUPTHER

## 2025-03-24 RX ORDER — DOXYCYCLINE HYCLATE 100 MG
100 TABLET ORAL 2 TIMES DAILY
Qty: 20 TABLET | Refills: 0 | Status: SHIPPED | OUTPATIENT
Start: 2025-03-24 | End: 2025-04-03

## 2025-03-24 RX ORDER — VALACYCLOVIR HYDROCHLORIDE 1 G/1
1000 TABLET, FILM COATED ORAL 2 TIMES DAILY
Qty: 20 TABLET | Refills: 0 | Status: SHIPPED | OUTPATIENT
Start: 2025-03-24 | End: 2025-04-03

## 2025-03-24 RX ORDER — ALBUTEROL SULFATE 90 UG/1
2 INHALANT RESPIRATORY (INHALATION) EVERY 6 HOURS PRN
Qty: 8 G | Refills: 3 | Status: SHIPPED | OUTPATIENT
Start: 2025-03-24 | End: 2025-04-23

## 2025-03-24 RX ORDER — NYSTATIN 100000 [USP'U]/G
POWDER TOPICAL 2 TIMES DAILY
Qty: 60 G | Refills: 1 | Status: SHIPPED | OUTPATIENT
Start: 2025-03-24 | End: 2025-04-07

## 2025-03-24 RX ORDER — VALACYCLOVIR HYDROCHLORIDE 1 G/1
1000 TABLET, FILM COATED ORAL DAILY
COMMUNITY

## 2025-03-24 ASSESSMENT — ENCOUNTER SYMPTOMS
FEVER: 0
ABDOMINAL PAIN: 0
CHILLS: 0

## 2025-03-24 NOTE — ASSESSMENT & PLAN NOTE
Intermittent forgetting of Paxil past  No short-term memory loss  No concerns exam  Offered memory test but he decided to hold off as he does not have any short-term memory loss or deficits in his ADLs  Can consider memory test in future

## 2025-03-24 NOTE — PROGRESS NOTES
Chief Complaint   Patient presents with    Other     6 month follow up       Subjective      Patient ID: Zaki Wilder is a 79 y.o. male.    HPI    HSV: States he went to dermatology and had biopsy done on scrotum.  Diagnosed with HSV infection.  Now on valacyclovir for suppression every day  Still complaining of lesion on left scrotum and rash. + Discomfort    Asthma: Stable.  Sees pulmonology.  Continues on same medications    Memory change: Intermittently forgets facts from past.  No short-term memory loss.  Still does his finances.  Still does treating.  Independent with all ADLs and IADLs      Objective     Vitals:    03/24/25 0856 03/24/25 1006   BP: 138/70    BP Location: Left upper arm    Patient Position: Sitting    Pulse: (!) 102 88   Resp: 16    Temp: 36.8 °C (98.3 °F)    TempSrc: Oral    SpO2: 98%    Weight: 90.2 kg (198 lb 12.8 oz)        The following have been reviewed and updated as appropriate in this visit:   Allergies  Meds  Problems        Review of Systems   Constitutional:  Negative for chills and fever.   Cardiovascular:  Negative for chest pain.   Gastrointestinal:  Negative for abdominal pain.         Current Outpatient Medications:     albuterol HFA 90 mcg/actuation inhaler, Inhale 2 puffs every 6 (six) hours as needed for wheezing., Disp: 8 g, Rfl: 3    azelastine 137 mcg (0.1 %) nasal spray, Administer 1 spray into each nostril daily., Disp: 30 mL, Rfl: 11    doxycycline hyclate (VIBRA-TABS) 100 mg tablet, Take 1 tablet (100 mg total) by mouth 2 (two) times a day for 10 days., Disp: 20 tablet, Rfl: 0    fluticasone propion-salmeteroL (ADVAIR DISKUS) 100-50 mcg/dose diskus inhaler, Inhale 1 puff 2 (two) times a day., Disp: , Rfl:     ketoconazole (NIZORAL) 2 % shampoo, apply topically TWICE A WEEK (MON, THU). apply to DAMP SKIN, LATH...  (REFER TO PRESCRIPTION NOTES)., Disp: 120 mL, Rfl: 1    montelukast (SINGULAIR) 10 mg tablet, Take 10 mg by mouth daily., Disp: , Rfl:     nystatin  (MYCOSTATIN) 100,000 unit/gram powder, Apply topically 2 (two) times a day for 14 days., Disp: 60 g, Rfl: 1    ruxolitinib (OPZELURA) 1.5 % cream, Apply topically., Disp: , Rfl:     tacrolimus (PROTOPIC) 0.1 % ointment, Apply topically daily., Disp: 100 g, Rfl: 3    valACYclovir (VALTREX) 1 gram tablet, Take 1 tablet (1,000 mg total) by mouth 2 (two) times a day for 10 days., Disp: 20 tablet, Rfl: 0    valACYclovir (VALTREX) 1 gram tablet, Take 1,000 mg by mouth daily., Disp: , Rfl:     clobetasoL (TEMOVATE) 0.05 % cream, Apply topically 2 (two) times a day., Disp: 60 g, Rfl: 0    fluticasone propionate (FLONASE) 50 mcg/actuation nasal spray, Administer 2 sprays into each nostril daily as needed for rhinitis., Disp: , Rfl:     triamcinolone (KENALOG) 0.1 % cream, Apply topically 2 (two) times a day as needed for irritation., Disp: 454 g, Rfl: 1    Physical Exam  Vitals reviewed.   Constitutional:       General: He is not in acute distress.     Appearance: He is well-developed.   HENT:      Head: Normocephalic and atraumatic.   Eyes:      Conjunctiva/sclera: Conjunctivae normal.   Cardiovascular:      Rate and Rhythm: Normal rate and regular rhythm.   Pulmonary:      Effort: Pulmonary effort is normal. No respiratory distress.      Breath sounds: Normal breath sounds. No wheezing or rales.   Abdominal:      General: There is no distension.      Palpations: Abdomen is soft.      Tenderness: There is no abdominal tenderness. There is no guarding.   Genitourinary:     Comments: Erythema of left scrotum  Vesicular lesion also  Musculoskeletal:         General: No swelling.   Neurological:      Mental Status: He is alert. Mental status is at baseline.   Psychiatric:         Mood and Affect: Mood normal.         Behavior: Behavior normal.         Assessment/Plan   HSV (herpes simplex virus) infection  Diagnosed by biopsy by dermatology  Still with lesion on left side  Currently on suppression therapy  Start valacyclovir  1000mg bid for 10 days  Then resume 1000mg daily for suppression    Rash  Surrounding erythema of the left scrotal vesicular lesion  Bacterial versus fungus  Doxycycline and nystatin helped in past  Start doxycyline  Start nystatin    Asthma  Sees pulmonology  Continue generic advair  Continue albuterol as needed  Continue motelukast    Memory change  Intermittent forgetting of Paxil past  No short-term memory loss  No concerns exam  Offered memory test but he decided to hold off as he does not have any short-term memory loss or deficits in his ADLs  Can consider memory test in future      No orders of the defined types were placed in this encounter.    New Medications Ordered This Visit   Medications    doxycycline hyclate (VIBRA-TABS) 100 mg tablet     Sig: Take 1 tablet (100 mg total) by mouth 2 (two) times a day for 10 days.     Dispense:  20 tablet     Refill:  0    nystatin (MYCOSTATIN) 100,000 unit/gram powder     Sig: Apply topically 2 (two) times a day for 14 days.     Dispense:  60 g     Refill:  1    valACYclovir (VALTREX) 1 gram tablet     Sig: Take 1 tablet (1,000 mg total) by mouth 2 (two) times a day for 10 days.     Dispense:  20 tablet     Refill:  0    valACYclovir (VALTREX) 1 gram tablet     Sig: Take 1,000 mg by mouth daily.    albuterol HFA 90 mcg/actuation inhaler     Sig: Inhale 2 puffs every 6 (six) hours as needed for wheezing.     Dispense:  8 g     Refill:  3     Brand of albuterol to be selected at the pharmacist's discretion.       Return in about 6 months (around 9/24/2025).     LATOYA GRANDA MD

## 2025-03-24 NOTE — ASSESSMENT & PLAN NOTE
Diagnosed by biopsy by dermatology  Still with lesion on left side  Currently on suppression therapy  Start valacyclovir 1000mg bid for 10 days  Then resume 1000mg daily for suppression

## 2025-03-24 NOTE — ASSESSMENT & PLAN NOTE
Surrounding erythema of the left scrotal vesicular lesion  Bacterial versus fungus  Doxycycline and nystatin helped in past  Start doxycyline  Start nystatin

## 2025-03-24 NOTE — PATIENT INSTRUCTIONS
HSV (herpes simplex virus) infection  Start valacyclovir 1000mg bid for 10 days  Then resume 1000mg daily for suppression    Rash  Start doxycyline  Start nystatin    Return in about 6 months (around 9/24/2025).

## 2025-05-02 ENCOUNTER — TELEPHONE (OUTPATIENT)
Dept: INTERNAL MEDICINE | Facility: CLINIC | Age: 80
End: 2025-05-02
Payer: COMMERCIAL

## 2025-05-28 ENCOUNTER — APPOINTMENT (OUTPATIENT)
Age: 80
Setting detail: DERMATOLOGY
End: 2025-05-28

## 2025-05-28 DIAGNOSIS — L82.0 INFLAMED SEBORRHEIC KERATOSIS: ICD-10-CM

## 2025-05-28 DIAGNOSIS — A60.9 ANOGENITAL HERPESVIRAL INFECTION, UNSPECIFIED: ICD-10-CM

## 2025-05-28 DIAGNOSIS — L20.89 OTHER ATOPIC DERMATITIS: ICD-10-CM

## 2025-05-28 DIAGNOSIS — L57.0 ACTINIC KERATOSIS: ICD-10-CM

## 2025-05-28 DIAGNOSIS — L21.8 OTHER SEBORRHEIC DERMATITIS: ICD-10-CM

## 2025-05-28 PROCEDURE — OTHER LIQUID NITROGEN: OTHER

## 2025-05-28 PROCEDURE — OTHER PRESCRIPTION MEDICATION MANAGEMENT: OTHER

## 2025-05-28 PROCEDURE — OTHER PRESCRIPTION: OTHER

## 2025-05-28 PROCEDURE — OTHER COUNSELING: OTHER

## 2025-05-28 PROCEDURE — 17003 DESTRUCT PREMALG LES 2-14: CPT

## 2025-05-28 PROCEDURE — 99214 OFFICE O/P EST MOD 30 MIN: CPT | Mod: 25

## 2025-05-28 PROCEDURE — 17000 DESTRUCT PREMALG LESION: CPT

## 2025-05-28 RX ORDER — TRIAMCINOLONE ACETONIDE 1 MG/G
CREAM TOPICAL
Qty: 453.6 | Refills: 1 | Status: ERX | COMMUNITY
Start: 2025-05-28

## 2025-05-28 RX ORDER — CLOBETASOL PROPIONATE 0.5 MG/ML
SOLUTION TOPICAL
Qty: 50 | Refills: 0 | Status: ERX | COMMUNITY
Start: 2025-05-28

## 2025-05-28 ASSESSMENT — LOCATION SIMPLE DESCRIPTION DERM
LOCATION SIMPLE: RIGHT HAND
LOCATION SIMPLE: RIGHT SCALP
LOCATION SIMPLE: LEFT HAND
LOCATION SIMPLE: LEFT UPPER BACK
LOCATION SIMPLE: RIGHT CALF
LOCATION SIMPLE: SCROTUM
LOCATION SIMPLE: LEFT SCALP
LOCATION SIMPLE: SCALP

## 2025-05-28 ASSESSMENT — LOCATION DETAILED DESCRIPTION DERM
LOCATION DETAILED: RIGHT RADIAL PALM
LOCATION DETAILED: LEFT ULNAR PALM
LOCATION DETAILED: LEFT ANTERIOR SCROTUM
LOCATION DETAILED: LEFT INFERIOR UPPER BACK
LOCATION DETAILED: LEFT CENTRAL FRONTAL SCALP
LOCATION DETAILED: LEFT CENTRAL PARIETAL SCALP
LOCATION DETAILED: RIGHT PROXIMAL CALF
LOCATION DETAILED: RIGHT CENTRAL FRONTAL SCALP
LOCATION DETAILED: RIGHT MEDIAL FRONTAL SCALP
LOCATION DETAILED: LEFT MEDIAL FRONTAL SCALP
LOCATION DETAILED: LEFT SUPERIOR PARIETAL SCALP

## 2025-05-28 ASSESSMENT — LOCATION ZONE DERM
LOCATION ZONE: TRUNK
LOCATION ZONE: LEG
LOCATION ZONE: HAND
LOCATION ZONE: SCALP
LOCATION ZONE: GENITALIA

## 2025-05-28 ASSESSMENT — ITCH NUMERIC RATING SCALE: HOW SEVERE IS YOUR ITCHING?: 1

## 2025-05-28 ASSESSMENT — BSA RASH: BSA RASH: 1

## 2025-06-09 ENCOUNTER — APPOINTMENT (OUTPATIENT)
Age: 80
Setting detail: DERMATOLOGY
End: 2025-06-10

## 2025-06-09 DIAGNOSIS — B37.2 CANDIDIASIS OF SKIN AND NAIL: ICD-10-CM

## 2025-06-09 PROCEDURE — 99213 OFFICE O/P EST LOW 20 MIN: CPT

## 2025-06-09 PROCEDURE — OTHER PRESCRIPTION: OTHER

## 2025-06-09 PROCEDURE — OTHER COUNSELING: OTHER

## 2025-06-09 RX ORDER — ECONAZOLE NITRATE 10 MG/G
CREAM TOPICAL
Qty: 30 | Refills: 0 | Status: ERX | COMMUNITY
Start: 2025-06-09

## 2025-06-09 ASSESSMENT — LOCATION DETAILED DESCRIPTION DERM
LOCATION DETAILED: RIGHT ANTERIOR PROXIMAL THIGH
LOCATION DETAILED: LEFT ANTERIOR PROXIMAL THIGH

## 2025-06-09 ASSESSMENT — LOCATION SIMPLE DESCRIPTION DERM
LOCATION SIMPLE: LEFT THIGH
LOCATION SIMPLE: RIGHT THIGH

## 2025-06-09 ASSESSMENT — LOCATION ZONE DERM: LOCATION ZONE: LEG

## 2025-06-24 ENCOUNTER — CONSULT (OUTPATIENT)
Dept: INTERNAL MEDICINE | Facility: CLINIC | Age: 80
End: 2025-06-24
Payer: COMMERCIAL

## 2025-06-24 VITALS
WEIGHT: 197 LBS | HEIGHT: 72 IN | SYSTOLIC BLOOD PRESSURE: 120 MMHG | DIASTOLIC BLOOD PRESSURE: 70 MMHG | HEART RATE: 72 BPM | BODY MASS INDEX: 26.68 KG/M2 | TEMPERATURE: 96.9 F | OXYGEN SATURATION: 99 %

## 2025-06-24 DIAGNOSIS — R25.1 TREMOR: ICD-10-CM

## 2025-06-24 DIAGNOSIS — H26.9 CATARACT OF BOTH EYES, UNSPECIFIED CATARACT TYPE: Primary | ICD-10-CM

## 2025-06-24 DIAGNOSIS — R21 RASH: ICD-10-CM

## 2025-06-24 PROCEDURE — 99214 OFFICE O/P EST MOD 30 MIN: CPT | Performed by: INTERNAL MEDICINE

## 2025-06-24 PROCEDURE — 3008F BODY MASS INDEX DOCD: CPT | Performed by: INTERNAL MEDICINE

## 2025-06-24 ASSESSMENT — ENCOUNTER SYMPTOMS
CHILLS: 0
ABDOMINAL PAIN: 0
FEVER: 0

## 2025-06-24 NOTE — PROGRESS NOTES
Chief Complaint   Patient presents with    Other     Pre-op exam  cataract surgery, left eye        Subjective      Patient ID: Zaki Wilder is a 79 y.o. male.    HPI    Cataracts: Going for surgery    Rash: In groin area.  Saw dermatology.  Econazole helped.  Resolved now    HSV: Off suppression now.  Doing okay    Asthma: Well-controlled    Tremor: Has not worsened    Objective   Body mass index is 26.72 kg/m².      Vitals:    06/24/25 1513   BP: 120/70   BP Location: Left upper arm   Patient Position: Sitting   Pulse: 72   Temp: (!) 36.1 °C (96.9 °F)   TempSrc: Oral   SpO2: 99%   Weight: 89.4 kg (197 lb)   Height: 1.829 m (6')       The following have been reviewed and updated as appropriate in this visit:   Allergies  Meds  Problems        Review of Systems   Constitutional:  Negative for chills and fever.   Cardiovascular:  Negative for chest pain.   Gastrointestinal:  Negative for abdominal pain.         Current Outpatient Medications:     azelastine 137 mcg (0.1 %) nasal spray, Administer 1 spray into each nostril daily., Disp: 30 mL, Rfl: 11    fluticasone propion-salmeteroL (ADVAIR DISKUS) 100-50 mcg/dose diskus inhaler, Inhale 1 puff 2 (two) times a day., Disp: , Rfl:     ketoconazole (NIZORAL) 2 % shampoo, apply topically TWICE A WEEK (MON, THU). apply to DAMP SKIN, LATH...  (REFER TO PRESCRIPTION NOTES)., Disp: 120 mL, Rfl: 1    montelukast (SINGULAIR) 10 mg tablet, Take 10 mg by mouth daily., Disp: , Rfl:     ruxolitinib (OPZELURA) 1.5 % cream, Apply topically., Disp: , Rfl:     tacrolimus (PROTOPIC) 0.1 % ointment, Apply topically daily., Disp: 100 g, Rfl: 3    albuterol HFA 90 mcg/actuation inhaler, Inhale 2 puffs every 6 (six) hours as needed for wheezing., Disp: 8 g, Rfl: 3    clobetasoL (TEMOVATE) 0.05 % cream, Apply topically 2 (two) times a day., Disp: 60 g, Rfl: 0    fluticasone propionate (FLONASE) 50 mcg/actuation nasal spray, Administer 2 sprays into each nostril daily as needed for  rhinitis., Disp: , Rfl:     triamcinolone (KENALOG) 0.1 % cream, Apply topically 2 (two) times a day as needed for irritation., Disp: 454 g, Rfl: 1    Physical Exam  Vitals reviewed.   Constitutional:       General: He is not in acute distress.     Appearance: He is well-developed.   HENT:      Head: Normocephalic and atraumatic.   Eyes:      Conjunctiva/sclera: Conjunctivae normal.   Cardiovascular:      Rate and Rhythm: Normal rate and regular rhythm.   Pulmonary:      Effort: Pulmonary effort is normal.      Breath sounds: Normal breath sounds.   Abdominal:      General: There is no distension.      Palpations: Abdomen is soft.      Tenderness: There is no abdominal tenderness.   Musculoskeletal:         General: No swelling.   Skin:     General: Skin is warm.   Neurological:      Mental Status: He is alert. Mental status is at baseline.   Psychiatric:         Mood and Affect: Mood normal.         Behavior: Behavior normal.         Assessment/Plan   Cataracts, bilateral  Stable for surgery    Rash  In the perineum area.  Nystatin did not help  Saw dermatology and econazole helped    Tremor  Stable  Has not worsened      No orders of the defined types were placed in this encounter.    No orders of the defined types were placed in this encounter.      Return if symptoms worsen or fail to improve.     LATOYA GRANDA MD

## 2025-09-03 ENCOUNTER — APPOINTMENT (OUTPATIENT)
Age: 80
Setting detail: DERMATOLOGY
End: 2025-09-03

## 2025-09-03 DIAGNOSIS — L82.1 OTHER SEBORRHEIC KERATOSIS: ICD-10-CM

## 2025-09-03 DIAGNOSIS — L20.89 OTHER ATOPIC DERMATITIS: ICD-10-CM

## 2025-09-03 DIAGNOSIS — L21.8 OTHER SEBORRHEIC DERMATITIS: ICD-10-CM

## 2025-09-03 DIAGNOSIS — D22 MELANOCYTIC NEVI: ICD-10-CM

## 2025-09-03 DIAGNOSIS — L81.4 OTHER MELANIN HYPERPIGMENTATION: ICD-10-CM

## 2025-09-03 DIAGNOSIS — L57.0 ACTINIC KERATOSIS: ICD-10-CM

## 2025-09-03 DIAGNOSIS — D18.0 HEMANGIOMA: ICD-10-CM

## 2025-09-03 DIAGNOSIS — L57.8 OTHER SKIN CHANGES DUE TO CHRONIC EXPOSURE TO NONIONIZING RADIATION: ICD-10-CM

## 2025-09-03 PROBLEM — D22.5 MELANOCYTIC NEVI OF TRUNK: Status: ACTIVE | Noted: 2025-09-03

## 2025-09-03 PROBLEM — D18.01 HEMANGIOMA OF SKIN AND SUBCUTANEOUS TISSUE: Status: ACTIVE | Noted: 2025-09-03

## 2025-09-03 PROCEDURE — OTHER COUNSELING: OTHER

## 2025-09-03 PROCEDURE — OTHER SUNSCREEN RECOMMENDATIONS: OTHER

## 2025-09-03 PROCEDURE — 99214 OFFICE O/P EST MOD 30 MIN: CPT | Mod: 25

## 2025-09-03 PROCEDURE — 17003 DESTRUCT PREMALG LES 2-14: CPT

## 2025-09-03 PROCEDURE — OTHER PRESCRIPTION MEDICATION MANAGEMENT: OTHER

## 2025-09-03 PROCEDURE — OTHER LIQUID NITROGEN: OTHER

## 2025-09-03 PROCEDURE — 17000 DESTRUCT PREMALG LESION: CPT

## 2025-09-03 ASSESSMENT — LOCATION DETAILED DESCRIPTION DERM
LOCATION DETAILED: RIGHT MID-UPPER BACK
LOCATION DETAILED: RIGHT INFERIOR UPPER BACK
LOCATION DETAILED: LOWER STERNUM
LOCATION DETAILED: RIGHT SUPERIOR PARIETAL SCALP
LOCATION DETAILED: LEFT SUPERIOR LATERAL MALAR CHEEK
LOCATION DETAILED: LEFT INFERIOR CENTRAL MALAR CHEEK
LOCATION DETAILED: LEFT CENTRAL PARIETAL SCALP
LOCATION DETAILED: RIGHT SUPERIOR MEDIAL MIDBACK
LOCATION DETAILED: RIGHT CENTRAL FRONTAL SCALP
LOCATION DETAILED: XIPHOID
LOCATION DETAILED: LEFT INFERIOR LATERAL MALAR CHEEK
LOCATION DETAILED: MIDDLE STERNUM
LOCATION DETAILED: LEFT SUPERIOR PARIETAL SCALP

## 2025-09-03 ASSESSMENT — BSA RASH: BSA RASH: 0

## 2025-09-03 ASSESSMENT — LOCATION ZONE DERM
LOCATION ZONE: FACE
LOCATION ZONE: SCALP
LOCATION ZONE: TRUNK

## 2025-09-03 ASSESSMENT — LOCATION SIMPLE DESCRIPTION DERM
LOCATION SIMPLE: RIGHT LOWER BACK
LOCATION SIMPLE: SCALP
LOCATION SIMPLE: CHEST
LOCATION SIMPLE: ABDOMEN
LOCATION SIMPLE: RIGHT SCALP
LOCATION SIMPLE: LEFT CHEEK
LOCATION SIMPLE: RIGHT UPPER BACK

## 2025-09-03 ASSESSMENT — ITCH NUMERIC RATING SCALE: HOW SEVERE IS YOUR ITCHING?: 0
